# Patient Record
Sex: MALE | Race: WHITE | NOT HISPANIC OR LATINO | URBAN - METROPOLITAN AREA
[De-identification: names, ages, dates, MRNs, and addresses within clinical notes are randomized per-mention and may not be internally consistent; named-entity substitution may affect disease eponyms.]

---

## 2020-03-16 ENCOUNTER — EMERGENCY (EMERGENCY)
Facility: HOSPITAL | Age: 60
LOS: 0 days | Discharge: HOME | End: 2020-03-16
Attending: EMERGENCY MEDICINE | Admitting: EMERGENCY MEDICINE
Payer: MEDICAID

## 2020-03-16 VITALS
HEART RATE: 89 BPM | SYSTOLIC BLOOD PRESSURE: 159 MMHG | TEMPERATURE: 97 F | RESPIRATION RATE: 19 BRPM | DIASTOLIC BLOOD PRESSURE: 94 MMHG | OXYGEN SATURATION: 98 %

## 2020-03-16 VITALS
DIASTOLIC BLOOD PRESSURE: 90 MMHG | SYSTOLIC BLOOD PRESSURE: 155 MMHG | OXYGEN SATURATION: 99 % | HEART RATE: 86 BPM | RESPIRATION RATE: 18 BRPM | TEMPERATURE: 97 F
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
## 2020-03-16 DIAGNOSIS — S61.412A LACERATION WITHOUT FOREIGN BODY OF LEFT HAND, INITIAL ENCOUNTER: ICD-10-CM

## 2020-03-16 DIAGNOSIS — Z23 ENCOUNTER FOR IMMUNIZATION: ICD-10-CM
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
## 2020-03-16 PROCEDURE — 73130 X-RAY EXAM OF HAND: CPT | Mod: 26,LT

## 2020-03-16 PROCEDURE — 99283 EMERGENCY DEPT VISIT LOW MDM: CPT | Mod: 25

## 2020-03-16 PROCEDURE — 12001 RPR S/N/AX/GEN/TRNK 2.5CM/<: CPT

## 2020-03-16 RX ORDER — TETANUS TOXOID, REDUCED DIPHTHERIA TOXOID AND ACELLULAR PERTUSSIS VACCINE, ADSORBED 5; 2.5; 8; 8; 2.5 [IU]/.5ML; [IU]/.5ML; UG/.5ML; UG/.5ML; UG/.5ML
0.5 SUSPENSION INTRAMUSCULAR ONCE
Refills: 0 | Status: COMPLETED | OUTPATIENT
Start: 2020-03-16 | End: 2020-03-16

## 2020-03-16 RX ADMIN — TETANUS TOXOID, REDUCED DIPHTHERIA TOXOID AND ACELLULAR PERTUSSIS VACCINE, ADSORBED 0.5 MILLILITER(S): 5; 2.5; 8; 8; 2.5 SUSPENSION INTRAMUSCULAR at 17:47

## 2020-03-16 NOTE — ED PROVIDER NOTE - NSFOLLOWUPINSTRUCTIONS_ED_ALL_ED_FT
Follow up with Dr. Santiago on Wed.     Laceration    A laceration is a cut that goes through all of the layers of the skin and into the tissue that is right under the skin. Some lacerations heal on their own. Others need to be closed with skin adhesive strips, skin glue, stitches (sutures), or staples. Proper laceration care minimizes the risk of infection and helps the laceration to heal better.  If non-absorbable stitches or staples have been placed, they must be taken out within the time frame instructed by your healthcare provider.    SEEK IMMEDIATE MEDICAL CARE IF YOU HAVE ANY OF THE FOLLOWING SYMPTOMS: swelling around the wound, worsening pain, drainage from the wound, red streaking going away from your wound, inability to move finger or toe near the laceration, or discoloration of skin near the laceration.

## 2020-03-16 NOTE — ED PROVIDER NOTE - CARE PLAN
Principal Discharge DX:	Skin avulsion  Secondary Diagnosis:	Tetanus-diphtheria vaccination administered at current visit

## 2020-03-16 NOTE — ED PROVIDER NOTE - CLINICAL SUMMARY MEDICAL DECISION MAKING FREE TEXT BOX
60yo M no significant past medical history unknown last tetanus presenting with L hand injury with saw immediately PTA. No numbness/focal weakness, no other injuries or other complaints. imaging reviewed. Comfortable with discharge and follow-up outpatient, strict return precautions given. Endorses understanding of all of this and aware that they can return at any time for new or concerning symptoms. No further questions or concerns at this time

## 2020-03-16 NOTE — ED PROVIDER NOTE - NS ED ROS FT
Constitutional: (-) weakness  Musculoskeletal: (-) joint pain,  Integumentary: (+) bleeding, (+) lac  Neurological: (-) tingling, (-)numbness,  Allergic/Immunologic: (-) pruritus

## 2020-03-16 NOTE — ED PROVIDER NOTE - PHYSICAL EXAMINATION
Physical Exam    Vital Signs: I have reviewed the initial vital signs.  Constitutional: well-nourished, appears stated age, no acute distress  Eyes: Conjunctiva pink, Sclera clear,  Cardiovascular: S1 and S2, regular rate, regular rhythm, well-perfused extremities, radial pulses equal and 2+  Musculoskeletal: arm and well perfused x4, moving all extremities, no edema. 2+ equal pulses throughout. <2sec capillary refill throughout. + full ROM active/passive across PIP/DIP/MCP. No FB.   Integumentary: L hand multiple skin avulsions throughout fingers. L 4th finger laceration across ventral surface, superficial, +venous bleeding, no visible tendon  Neurologic: awake, alert, nvi

## 2020-03-16 NOTE — ED PROVIDER NOTE - ATTENDING CONTRIBUTION TO CARE
60yo M no significant past medical history unknown last tetanus presenting with L hand injury with saw immediately PTA. No numbness/focal weakness, no other injuries or other complaints.   Constitutional: Well appearing. No acute distress. Non toxic.   Eyes: PERRLA. Extraocular movements intact, no entrapment. Conjunctiva normal.   ENT: No nasal discharge. Moist mucus membranes.  Neck: Supple, non tender, full range of motion.  Ext: Warm and well perfused x4, moving all extremities, no edema. 2+ equal pulses throughout. <2sec capillary refill throughout. +L hand multiple skin avulsions throughout fingers. L 4th finger laceration across ventral surface, superficial, +venous bleeding, no visible tendon, full ROM active/passive across PIP/DIP/MCP. No FB.   Psy: Cooperative, appropriate.   Skin: Warm, dry, no rash  Neuro: CN2-12 grossly intact no sensory or motor deficits throughout, no drift, no ataxia

## 2020-03-16 NOTE — ED PROVIDER NOTE - OBJECTIVE STATEMENT
60 yo male, no pmh, unsure ot tetanus status, presents to ed for laceration ro left hand. occurred today, using saw,  mild, bleeding. Denies, weakness, joint pain.

## 2020-03-16 NOTE — ED PROVIDER NOTE - PATIENT PORTAL LINK FT
You can access the FollowMyHealth Patient Portal offered by [facility] by registering at the following website: [email or web address]. By joining [employer]’s FollowMyHealth portal, you will also be able to view your health information using other applications (apps) compatible with our system.

## 2020-03-16 NOTE — ED PROVIDER NOTE - CARE PROVIDER_API CALL
Moises Hebert (DO)  Plastic Surgery  [address] [place] [address]  Phone: [phone]  Fax: [phone]  Follow Up Time: 1-3 Days

## 2020-09-26 ENCOUNTER — EMERGENCY (EMERGENCY)
Facility: HOSPITAL | Age: 60
LOS: 0 days | Discharge: HOME | End: 2020-09-26
Attending: EMERGENCY MEDICINE | Admitting: EMERGENCY MEDICINE
Payer: MEDICAID

## 2020-09-26 VITALS
HEART RATE: 76 BPM | SYSTOLIC BLOOD PRESSURE: 136 MMHG | OXYGEN SATURATION: 98 % | RESPIRATION RATE: 17 BRPM | TEMPERATURE: 99 F | DIASTOLIC BLOOD PRESSURE: 84 MMHG

## 2020-09-26 DIAGNOSIS — Z87.39 PERSONAL HISTORY OF OTHER DISEASES OF THE MUSCULOSKELETAL SYSTEM AND CONNECTIVE TISSUE: ICD-10-CM

## 2020-09-26 DIAGNOSIS — D35.2 BENIGN NEOPLASM OF PITUITARY GLAND: ICD-10-CM

## 2020-09-26 PROBLEM — M19.90 UNSPECIFIED OSTEOARTHRITIS, UNSPECIFIED SITE: Chronic | Status: ACTIVE | Noted: 2020-03-16

## 2020-09-26 PROCEDURE — 99284 EMERGENCY DEPT VISIT MOD MDM: CPT

## 2020-09-26 PROCEDURE — 70450 CT HEAD/BRAIN W/O DYE: CPT | Mod: 26

## 2020-09-26 NOTE — ED PROVIDER NOTE - PATIENT PORTAL LINK FT
You can access the FollowMyHealth Patient Portal offered by [facility] by registering at the following website: [email or web address]. By joining [employer]’s FollowMyHealth portal, you will also be able to view your health information using other applications (apps) compatible with our system.

## 2020-09-26 NOTE — ED PROVIDER NOTE - PHYSICAL EXAMINATION
CONST: NAD  EYES: PERRL, EOMI, Sclera and conjunctiva clear. Pressure 10/10 b/l. Vision 20/25 b/l  ENT: No nasal discharge. Oropharynx normal appearing, no erythema or exudates. No abscess or swelling. Uvula midline.   NECK: Non-tender, no meningeal signs. normal ROM. supple   CARD: S1 S2; No jvd  RESP: Equal BS B/L, No wheezes, rhonchi or rales. No distress  GI: Soft, non-tender, non-distended. no cva tenderness. normal BS  MS: Normal ROM in all extremities. pulses 2 +. no calf tenderness or swelling  SKIN: Warm, dry, no acute rashes. Good turgor  NEURO: A&Ox3, No focal deficits. Strength 5/5 with no sensory deficits. Steady gait. Finger to nose intact. Negative pronator drift

## 2020-09-26 NOTE — ED PROVIDER NOTE - OBJECTIVE STATEMENT
60y M no ppmh sent from opthalmologist for eval. Pt was seen yesterday for routine eye exam and had bitemporal hemiopsia told to fu for medical eval. Now pt presents with no complaint. Denies fever, ha, change in vision, cp, cough, sob, weakness, numbness, n/v/d/c, dysuria, hematuria

## 2020-09-26 NOTE — ED PROVIDER NOTE - ATTENDING CONTRIBUTION TO CARE
59 yo M presented to ED for MRI. Pt was seen by an ophthalmologist 2 days ago and was told he might have increased ICP, a brain mass or aneurysm and told him to come to the ER for an MRI. Patient did not come right away but then got nervous so he came to the ED. He is not having any HA, blurred vision, nausea, vomiting, abdominal pain, fever or chills.     Const: Well nourished, well developed, appears stated age  Eyes: PERRL, no conjunctival injection  HENT:  Neck supple without meningismus   CV: RRR, Warm, well-perfused extremities  RESP: CTA B/L, no tachypnea   GI: soft, non-tender, non-distended  MSK: No gross deformities appreciated  Skin: Warm, dry. No rashes  Neuro: Alert, CNs II-XII grossly intact. Sensation and motor function of extremities grossly intact.  Psych: Appropriate mood and affect.    Unclear why optho was concerned for increased ICP. Will do screening CT head

## 2020-09-26 NOTE — ED PROVIDER NOTE - CLINICAL SUMMARY MEDICAL DECISION MAKING FREE TEXT BOX
59 yo M presented to ED from [facility]. Pt found to have pituitary adenoma. Patient is not having any HA. Normal gait. Discussed findings with pt. DC home

## 2020-09-26 NOTE — ED PROVIDER NOTE - PROGRESS NOTE DETAILS
Results discussed in depth with pt and family, they are requesting to leave and fu outpt, refusing further evaluation in ED, risks and return precautions explained

## 2020-09-26 NOTE — ED ADULT TRIAGE NOTE - CHIEF COMPLAINT QUOTE
as per son, pt was seen by eye doctor and was sent in for stat MRI of brain and orbit to r/o mass or aneurysm. No visual changes as per son, pt was seen by eye doctor and was sent in for stat MRI of brain and orbit to r/o mass or aneurysm. No visual changes or pain. Findings were found at routine eye exam. No complaints

## 2020-09-26 NOTE — ED ADULT NURSE NOTE - OBJECTIVE STATEMENT
Pt aox3, in no acute distress, c/o Pt aox3, in no acute distress, c/o being sent in by eye doctor for increased icp. Pt asymptomatic.

## 2020-09-26 NOTE — ED ADULT NURSE NOTE - CHIEF COMPLAINT QUOTE
as per son, pt was seen by eye doctor and was sent in for stat MRI of brain and orbit to r/o mass or aneurysm. No visual changes or pain. Findings were found at routine eye exam. No complaints

## 2020-11-05 ENCOUNTER — OUTPATIENT (OUTPATIENT)
Dept: OUTPATIENT SERVICES | Facility: HOSPITAL | Age: 60
LOS: 1 days | Discharge: HOME | End: 2020-11-05
Payer: MEDICAID

## 2020-11-05 DIAGNOSIS — H53.47 HETERONYMOUS BILATERAL FIELD DEFECTS: ICD-10-CM

## 2020-11-05 PROCEDURE — 70553 MRI BRAIN STEM W/O & W/DYE: CPT | Mod: 26

## 2022-05-21 ENCOUNTER — OUTPATIENT (OUTPATIENT)
Dept: OUTPATIENT SERVICES | Facility: HOSPITAL | Age: 62
LOS: 1 days | Discharge: HOME | End: 2022-05-21
Payer: MEDICAID

## 2022-05-21 DIAGNOSIS — R51.9 HEADACHE, UNSPECIFIED: ICD-10-CM

## 2022-05-21 PROCEDURE — 70553 MRI BRAIN STEM W/O & W/DYE: CPT | Mod: 26

## 2022-08-12 PROBLEM — Z00.00 ENCOUNTER FOR PREVENTIVE HEALTH EXAMINATION: Status: ACTIVE | Noted: 2022-08-12

## 2022-08-17 ENCOUNTER — LABORATORY RESULT (OUTPATIENT)
Age: 62
End: 2022-08-17

## 2022-08-17 ENCOUNTER — NON-APPOINTMENT (OUTPATIENT)
Age: 62
End: 2022-08-17

## 2022-08-17 ENCOUNTER — APPOINTMENT (OUTPATIENT)
Dept: NEUROSURGERY | Facility: CLINIC | Age: 62
End: 2022-08-17

## 2022-08-17 VITALS
TEMPERATURE: 97.6 F | HEART RATE: 69 BPM | BODY MASS INDEX: 24.8 KG/M2 | DIASTOLIC BLOOD PRESSURE: 73 MMHG | SYSTOLIC BLOOD PRESSURE: 113 MMHG | WEIGHT: 140 LBS | HEIGHT: 63 IN | OXYGEN SATURATION: 98 %

## 2022-08-17 PROCEDURE — 99204 OFFICE O/P NEW MOD 45 MIN: CPT

## 2022-08-17 NOTE — DATA REVIEWED
[de-identified] : \par \par ACC: [id number] EXAM: MR SELLA ONLY WAW IC\par \par PROCEDURE DATE: 05/21/2022\par \par \par \par INTERPRETATION: Clinical History / Reason for exam: For evaluation of a pituitary macroadenoma.\par \par MRI OF THE SELLA TURCICA WITHOUT AND WITH CONTRAST\par \par TECHNIQUE:\par \par Multiplanar multisequence imaging of the sella turcica was performed on the low-field magnet before and after the intravenous administration of 6 cc (1.5 cc discarded) of Gadavist. In addition, transaxial diffusion weighted images and transaxial FLAIR images were obtained.\par \par COMPARISON:\par \par MRI of the sella turcica without and with contrast dated November 5, 2020.\par \par FINDINGS:\par \par Transaxial diffusion and transaxial FLAIR images of the brain demonstrate the third, fourth, and lateral ventricles to be normal in size and position.\par \par There is a large mass arising in the sella turcica resulting in enlargement of the sella turcica, effacement of the optic chiasm, anterior extension into the sphenoid sinuses, posterior extension into the prepontine cistern, and lateral extension into the cavernous sinuses (almost complete encasement of the right cavernous internal carotid artery and partial encasement of the left cavernous internal carotid artery). The mass measures 2.7 x 2.3 x 3.3 cm. Following the intravenous administration of Gadavist there is heterogeneous enhancement. This finding is consistent with pituitary macroadenoma.\par \par IMPRESSION:\par \par In comparison with the prior MRI of the sella turcica dated November 5, 2020:\par \par Again demonstrated is a large mass arising in the sella turcica consistent with a pituitary macroadenoma, unchanged.\par \par --- End of Report ---

## 2022-08-17 NOTE — ASSESSMENT
[FreeTextEntry1] : Patient is a 62-year-old gentleman who suffered worsening left eye vision approximately 2 years ago and was diagnosed with a pituitary macroadenoma.  Due to problems with the pandemic, he was lost to follow-up initially but was able to see an endocrinologist.  Per his report, his labs are normal and his prolactin is normal.  He continues to have diminished vision in the left eye.  We discussed the natural history of benign macroadenomas.  I explained that I believe surgical intervention is important in this situation to decompress the optic nerves and mitigate the risk of future vision loss.  I have advised him to obtain a new endocrine panel and continue follow-up with his endocrinologist.  I have referred him to see Dr. Brandon Black for visual field testing's.  I have also referred him to Dr. Richardson Birmingham for an introduction prior to surgical intervention.  We will obtain updated imaging and plan for surgery in early October per the patient.\par \par Dr. D' Amico independently reviewed all available images with patient and his son, MRI SELLA w/wo contrast 5/21/22. \par \par PLAN: \par - MRI Pituitary w/wo contrast\par - CT maxillofacial\par - Referral to ENT Dr. Birmingham\par - Endocrine lab panel, continue f/u with his endocrinologist Dr. Jim\par - Referral to see neuro- opth Dr. Brandon Black for visual field testing\par - RTC after imaging to review\par \par Patient and his son verbalize understanding of today’s discussion and next steps in treatment plan. \par \par \par \par A total of 45 minutes was spent reviewing the labs, imaging and physical examination of the patient. We discussed the diagnosis, and the plan. The patient's questions were answered. The patient demonstrated an excellent understanding of the plan. \par \par

## 2022-08-17 NOTE — REVIEW OF SYSTEMS
[As Noted in HPI] : as noted in HPI [Eyesight Problems] : eyesight problems [Fever] : no fever [Chills] : no chills [Confused or Disoriented] : no confusion [Memory Lapses or Loss] : no memory loss [Seizures] : no convulsions [Dizziness] : no dizziness [Difficulty Walking] : no difficulty walking [Chest Pain] : no chest pain [Palpitations] : no palpitations [Shortness Of Breath] : no shortness of breath [Cough] : no cough

## 2022-08-17 NOTE — HISTORY OF PRESENT ILLNESS
[de-identified] : 63 y/o male with PMHx of rheumatoid arthritis, pituitary adenoma dx Sept 2020 found during workup for bitemporal hemiopsia. \par \par Pt is accompanied by his son who assists in HPI today. Pt went to optometrist for left vision changes Sept 2020 for which he was sent to ER for further workup. He was dx with pituitary adenoma. He endorses difficulty seeing neurosurgeon s/t insurance issues. He recently repeated MRI 5/21/22 with report unchanged. \par He endorses continued left eye left side blurriness. He denies tunnel vision. Notes intermittent left eye double vision. \par He denies dizziness, headaches, abnormal growth of hands/feet, abnormal growth of facial features, significant unexplained weight loss/gain, abnormal hair growth/loss, or galactorrhea.\par \par He saw endocrinologist Dr. Jim in [place] 2 months ago and states labs were WNL, he does not have report with him for today's visit. \par  \par PCP: Dr. Isela Powell \par Endo: Dr. Mey Jim

## 2022-08-17 NOTE — PHYSICAL EXAM
[General Appearance - Alert] : alert [General Appearance - In No Acute Distress] : in no acute distress [Oriented To Time, Place, And Person] : oriented to person, place, and time [Impaired Insight] : insight and judgment were intact [Sclera] : the sclera and conjunctiva were normal [PERRL With Normal Accommodation] : pupils were equal in size, round, reactive to light, with normal accommodation [Hearing Threshold Finger Rub Not [place]] : hearing was normal [Neck Appearance] : the appearance of the neck was normal [] : no respiratory distress [Respiration, Rhythm And Depth] : normal respiratory rhythm and effort [Skin Color & Pigmentation] : normal skin color and pigmentation [Motor Tone] : muscle tone was normal in all four extremities [Motor Strength] : muscle strength was normal in all four extremities [Sensation Tactile Decrease] : light touch was intact [Abnormal Walk] : normal gait [FreeTextEntry5] : CN II- XII grossly intact, left blurred vision reported on exam

## 2022-08-19 LAB
ANION GAP SERPL CALC-SCNC: 13 MMOL/L
BUN SERPL-MCNC: 24 MG/DL
CALCIUM SERPL-MCNC: 9.5 MG/DL
CHLORIDE SERPL-SCNC: 105 MMOL/L
CO2 SERPL-SCNC: 22 MMOL/L
CREAT SERPL-MCNC: 1.26 MG/DL
DHEA-S SERPL-MCNC: 45.8 UG/DL
EGFR: 64 ML/MIN/1.73M2
ESTRADIOL SERPL-MCNC: 12 PG/ML
FSH SERPL-MCNC: 4.6 IU/L
GH SERPL-MCNC: <0.03 NG/ML
GLUCOSE SERPL-MCNC: 107 MG/DL
IGF-1 INTERP: NORMAL
IGF-I BLD-MCNC: 98 NG/ML
LH SERPL-ACNC: 3.9 IU/L
POTASSIUM SERPL-SCNC: 4.9 MMOL/L
PROLACTIN SERPL-MCNC: 0.9 NG/ML
SHBG SERPL-SCNC: 44.9 NMOL/L
SODIUM SERPL-SCNC: 140 MMOL/L
T3RU NFR SERPL: 1.1 TBI
T4 FREE SERPL-MCNC: 0.9 NG/DL
T4 SERPL-MCNC: 6 UG/DL
TESTOST SERPL-MCNC: 394 NG/DL
TSH SERPL-ACNC: 1.42 UIU/ML

## 2022-08-22 LAB
MONOMERIC PROLACTIN (ICMA)*: 0.74 NG/ML
PERCENT MACROPROLACTIN: 10 %
PROLACTIN, SERUM (ICMA)*: 0.82 NG/ML

## 2022-08-30 LAB
CORTICOSTEROID BIND GLOBULIN: 1.7 MG/DL
CORTIS SERPL-MCNC: 7.3 UG/DL
CORTISOL, FREE: 0.5 UG/DL
PFCX: 6.9 %

## 2022-09-11 ENCOUNTER — RESULT REVIEW (OUTPATIENT)
Age: 62
End: 2022-09-11

## 2022-09-11 ENCOUNTER — OUTPATIENT (OUTPATIENT)
Dept: OUTPATIENT SERVICES | Facility: HOSPITAL | Age: 62
LOS: 1 days | Discharge: HOME | End: 2022-09-11

## 2022-09-11 DIAGNOSIS — D35.2 BENIGN NEOPLASM OF PITUITARY GLAND: ICD-10-CM

## 2022-09-11 DIAGNOSIS — H54.62 UNQUALIFIED VISUAL LOSS, LEFT EYE, NORMAL VISION RIGHT EYE: ICD-10-CM

## 2022-09-11 PROCEDURE — 70553 MRI BRAIN STEM W/O & W/DYE: CPT | Mod: 26

## 2022-09-23 ENCOUNTER — APPOINTMENT (OUTPATIENT)
Dept: NEUROSURGERY | Facility: CLINIC | Age: 62
End: 2022-09-23

## 2022-09-23 DIAGNOSIS — Z87.39 PERSONAL HISTORY OF OTHER DISEASES OF THE MUSCULOSKELETAL SYSTEM AND CONNECTIVE TISSUE: ICD-10-CM

## 2022-09-23 PROCEDURE — 99212 OFFICE O/P EST SF 10 MIN: CPT | Mod: 95

## 2022-10-02 ENCOUNTER — OUTPATIENT (OUTPATIENT)
Dept: OUTPATIENT SERVICES | Facility: HOSPITAL | Age: 62
LOS: 1 days | Discharge: HOME | End: 2022-10-02

## 2022-10-02 ENCOUNTER — RESULT REVIEW (OUTPATIENT)
Age: 62
End: 2022-10-02

## 2022-10-02 DIAGNOSIS — H54.62 UNQUALIFIED VISUAL LOSS, LEFT EYE, NORMAL VISION RIGHT EYE: ICD-10-CM

## 2022-10-02 DIAGNOSIS — D35.2 BENIGN NEOPLASM OF PITUITARY GLAND: ICD-10-CM

## 2022-10-02 PROCEDURE — 70486 CT MAXILLOFACIAL W/O DYE: CPT | Mod: 26

## 2022-10-11 ENCOUNTER — OUTPATIENT (OUTPATIENT)
Dept: OUTPATIENT SERVICES | Facility: HOSPITAL | Age: 62
LOS: 1 days | Discharge: HOME | End: 2022-10-11

## 2022-10-11 DIAGNOSIS — R07.9 CHEST PAIN, UNSPECIFIED: ICD-10-CM

## 2022-10-11 PROCEDURE — 71046 X-RAY EXAM CHEST 2 VIEWS: CPT | Mod: 26

## 2022-10-13 ENCOUNTER — APPOINTMENT (OUTPATIENT)
Dept: NEUROSURGERY | Facility: CLINIC | Age: 62
End: 2022-10-13

## 2022-10-13 PROCEDURE — 99215 OFFICE O/P EST HI 40 MIN: CPT | Mod: 95

## 2022-10-13 NOTE — HISTORY OF PRESENT ILLNESS
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 \par \stephanie Presented for evaluation 8/17/22 and endorsed he went to optometrist for left vision changes Sept 2020 for which he was sent to ER for further workup. He was dx with pituitary adenoma. He endorses difficulty seeing neurosurgeon s/t insurance issues. His most recent MRI done 5/21/22 with report unchanged. \par He endorses continued left eye left side blurriness. He denies tunnel vision. Notes intermittent left eye double vision. \par He denies dizziness, headaches, abnormal growth of hands/feet, abnormal growth of facial features, significant unexplained weight loss/gain, abnormal hair growth/loss, or galactorrhea.\par He saw endocrinologist Dr. Jim in [place] 2 months ago and states labs were WNL, he does not have report with him for today's visit. \par Patient referred to see neuro-opth Brandon Black and plan made for him to repeat MRI pituitary, CT max/face. \par \par Patient returned 9/23/22 and MRI Sella done 9/11/22 reviewed. Plan made for surgical intervention 10/21/22.\par Patient again referred to see neuro- opth Brandon Black and plan made for pre-op CT max/face and see ENT Dr. Birmingham.\par \par TODAY patient returns for follow- up and to discuss surgery further. He is accompanied by his son for today's visit. He is scheduled to see ENT Dr. Birmingham 10/20. \par  \par PCP: Dr. Isela Powell \par Endo: Dr. Mey Jim \par

## 2022-10-13 NOTE — DATA REVIEWED
[de-identified] : \par \par ACC: [id number] EXAM: CT MAXILLOFACIAL\par \par PROCEDURE DATE: 10/02/2022\par \par \par \par INTERPRETATION: CLINICAL INDICATION: Preoperative exam for macroadenoma.\par \par TECHNIQUE: CT of the sinuses was performed without intravenous contrast.\par \par Helically acquired images through the sinuses using multirow detector technique were reformatted in coronal and sagittal plane and viewed in bone and soft tissue window. Images were acquired without gantry tilt or head guerra, and included the tip of the nose, upper lips, and ears in the field of view.\par \par COMPARISON: CT head dated 9/26/2020.\par \par FINDINGS:\par SPHENOID SINUSES, DRAINAGE PATHWAYS AND ASSOCIATED AREAS:\par There is thinning versus dehiscence of the posterior sphenoid sinus walls bilaterally with extension of the tumor into the posterior aspect of the sphenoid sinuses bilaterally. There is erosive changes noted of the sphenoid septum which directly abuts the mass. There is bony thinning versus dehiscence of the cavernous sinuses as well as the dorsum sella.\par \par The bones adjacent to the sinuses, including the lamina papyracea, cribriform plate and fovea ethmoidalis, are intact.\par \par FRONTAL SINUSES, DRAINAGE PATHWAYS AND ASSOCIATED AREAS:\par The frontal sinuses are developed and well aerated. The frontal sinus outflow tracts are free of mucosal disease.\par \par NASAL SEPTUM:\par There is mild leftward nasal deviation.\par \par NASAL CAVITY:\par Evaluation of the nasal cavity fails to demonstrate dominant masses.\par \par ETHMOID SINUSES:\par The ethmoid air cells are free of mucosal disease.\par \par MAXILLARY SINUSES, DRAINAGE PATHWAYS AND ASSOCIATED AREAS:\par There is mild mucosal thickening of the bilateral maxillary sinuses. The ostiomeatal units are free of mucosal disease.\par \par IMPRESSION:\par Redemonstration of an expansile solid/suprasellar mass with bony dehiscence of the bilateral cavernous sinuses, posterior wall of the sphenoid sinuses as well as the dorsum sella.\par \par --- End of Report --- [de-identified] : \par \par ACC: [id number] EXAM: MR SELLA ONLY WAW IC\par \par PROCEDURE DATE: 09/11/2022\par \par \par \par INTERPRETATION: Clinical indication: Follow-up pituitary microadenoma. Left vision loss.\par \par Technique: Contrast enhanced sella MRI was performed.\par \par Through the brain axial FLAIR images as well as post contrast images were obtained. Through the sella, coronal T2, T1, postcontrast T1 fat suppression dynamic T1, sagittal T1, post contrast T1 fat suppression sequences were obtained.\par \par 7.5 mls of Gadavist was administered intravenously without complication and 0 mls were discarded.\par \par COMPARISON: MR sella dated 5/21/2022.\par \par FINDINGS:\par \par Since prior examination there has been slight interval growth in a large enhancing mass within the sella and suprasellar cisterns. There is significant upper mass effect upon the optic chiasm. The mass measures 3.7 cm in craniocaudal extent. The mass likely invades the bilateral cavernous sinuses and completely surrounds the right cavernous ICA and partially surrounds the left cavernous ICA, series 9 image 8.\par \par The ventricles and sulci are within normal limits.\par \par There are no areas of abnormal signal or enhancement within the brain parenchyma.\par \par There is no intraparenchymal hematoma, mass effect or midline shift.\par \par No abnormal extra-axial fluid collections are present.\par \par The calvarium is intact.\par \par The visualized intraorbital compartments, paranasal sinuses and tympanomastoid cavities appear free of acute disease.\par \par \par IMPRESSION:\par Since prior MR sella of 5/21/2022:\par \par There has been slight continued increase in size of a pituitary macroadenoma with invasion of the bilateral cavernous sinuses as well as significant mass effect upon the optic chiasm..\par \par --- End of Report ---

## 2022-10-13 NOTE — PHYSICAL EXAM
[General Appearance - Alert] : alert [General Appearance - In No Acute Distress] : in no acute distress [Sclera] : the sclera and conjunctiva were normal [Neck Appearance] : the appearance of the neck was normal [] : no respiratory distress [Respiration, Rhythm And Depth] : normal respiratory rhythm and effort [Skin Color & Pigmentation] : normal skin color and pigmentation

## 2022-10-13 NOTE — REVIEW OF SYSTEMS
[As Noted in HPI] : as noted in HPI [Fever] : no fever [Chills] : no chills [Confused or Disoriented] : no confusion [Facial Weakness] : no facial weakness [Arm Weakness] : no arm weakness [Hand Weakness] : no hand weakness [Leg Weakness] : no leg weakness [Numbness] : no numbness [Tingling] : no tingling [Seizures] : no convulsions [Dizziness] : no dizziness [Chest Pain] : no chest pain [Palpitations] : no palpitations [Shortness Of Breath] : no shortness of breath [Cough] : no cough

## 2022-10-13 NOTE — REASON FOR VISIT
[Follow-Up: _____] : a [unfilled] follow-up visit [Home] : at home, [unfilled] , at the time of the visit. [Medical Office: ([facility])___] : at the medical office located in  [Family Member] : family member [Patient] : the patient [FreeTextEntry1] : pituitary macroadenoma, discuss surgery more

## 2022-10-20 ENCOUNTER — APPOINTMENT (OUTPATIENT)
Dept: OTOLARYNGOLOGY | Facility: CLINIC | Age: 62
End: 2022-10-20

## 2022-10-20 ENCOUNTER — TRANSCRIPTION ENCOUNTER (OUTPATIENT)
Age: 62
End: 2022-10-20

## 2022-10-20 VITALS
TEMPERATURE: 98 F | RESPIRATION RATE: 16 BRPM | DIASTOLIC BLOOD PRESSURE: 86 MMHG | WEIGHT: 135.36 LBS | HEIGHT: 65 IN | OXYGEN SATURATION: 98 % | SYSTOLIC BLOOD PRESSURE: 147 MMHG | HEART RATE: 84 BPM

## 2022-10-20 VITALS
WEIGHT: 135 LBS | DIASTOLIC BLOOD PRESSURE: 85 MMHG | HEART RATE: 63 BPM | BODY MASS INDEX: 23.92 KG/M2 | SYSTOLIC BLOOD PRESSURE: 133 MMHG | HEIGHT: 63 IN | TEMPERATURE: 96.5 F

## 2022-10-20 LAB — SARS-COV-2 N GENE NPH QL NAA+PROBE: NOT DETECTED

## 2022-10-20 PROCEDURE — 31231 NASAL ENDOSCOPY DX: CPT

## 2022-10-20 PROCEDURE — 99204 OFFICE O/P NEW MOD 45 MIN: CPT | Mod: 25

## 2022-10-20 RX ORDER — INFLUENZA VIRUS VACCINE 15; 15; 15; 15 UG/.5ML; UG/.5ML; UG/.5ML; UG/.5ML
0.5 SUSPENSION INTRAMUSCULAR ONCE
Refills: 0 | Status: DISCONTINUED | OUTPATIENT
Start: 2022-10-21 | End: 2022-10-24

## 2022-10-20 NOTE — ASSESSMENT
[FreeTextEntry1] : 62M referred for endoscopic transsphenoidal pituitary tumor resection, planned for 10/21/22. He has large pituitary macroadenoma found in setting of left vision issues. He has seasonal allergies and takes allegra as needed. MRI demonstrates a 3.7cm enhancing mass within the sella and suprasellar cisterns w significant upper mass effect upon the optic chiasm and likely invasion of bilateral cavernous sinuses, as above. Nasal endoscopy is unremarkable.\par Plan for endoscopic transsphenoidal approach to resection of pituitary macroadenoma. I discussed my role in the procedure in detail including all risks and postoperative expectations. I reviewed possibility for CSF leak as well and methods of repair. All questions were answered and they wish to proceed as planned.\par

## 2022-10-20 NOTE — PATIENT PROFILE ADULT - FUNCTIONAL ASSESSMENT - BASIC MOBILITY 1.
MEDICARE WELLNESS VISIT NOTE    HISTORY OF PRESENT ILLNESS:   Nicky Castanon presents for her Welcome to Medicare Medicare Wellness Visit.   She has no current complaints or concerns.      Patient Care Team:  Nan Cruz MD as PCP - General (Internal Medicine)        Patient Active Problem List   Diagnosis   • Allergic rhinitis   • Allergic conjunctivitis   • Vocal cord dysfunction   • Microscopic hematuria   • Chronic cough   • Anemia - Macrocytic Hyperchromic   • Elevated blood pressure   • Primary osteoarthritis of first carpometacarpal joint of right hand   • MGUS (monoclonal gammopathy of unknown significance)   • Leukopenia, unspecified type         Past Medical History:   Diagnosis Date   • Allergic conjunctivitis    • Allergic rhinitis    • Anemia    • Bronchitis 2007   • Elevated BP    • Failed conscious sedation during procedure 02/22/2017   • Fracture     left arm   • Fracture 2014    Rt elbow   • IBS (irritable bowel syndrome)     quiescent   • Kidney stone on left side     eswl scheduled for 2.7.13   • Monoclonal gammopathy    • Pneumonia 2006   • Right ureteral stone 2007    calcium stone   • Vocal cord dysfunction          Past Surgical History:   Procedure Laterality Date   • ------------other------------- Right     wrist/thumb surgery.   • Appendectomy      in High school   • Colonoscopy  2/2007    normal   • Colonoscopy diagnostic  02/22/2017    Affi 10yr recall, screening   • Elbow surgery Right 2/9/15   • Lithotripsy - gen'l class  12/2007   • Past surgical history  2/ 2013    PS of stent for kidney stoneDr. Nancy   • Tonsillectomy and adenoidectomy      in college         Social History     Tobacco Use   • Smoking status: Never Smoker   • Smokeless tobacco: Never Used   Vaping Use   • Vaping Use: never used   Substance Use Topics   • Alcohol use: Yes     Alcohol/week: 10.0 standard drinks     Types: 10 Standard drinks or equivalent per week     Comment: moderate   • Drug use: No      Drug use:    Drug Use:    No              Family History   Problem Relation Age of Onset   • Cancer Mother         liver   • Other Mother         subdural hematoma   • Cancer Father         pancreatic/liver    • Heart Father         MI possibly from emboli from CA   • Respiratory Father         COPD   • Respiratory Maternal Grandfather         COPD   • Cancer Maternal Aunt         lung   • NEGATIVE FAMILY HX OF Other         no breast, ovarian or colon CA   • Patient is unaware of any medical problems Maternal Grandmother    • Patient is unaware of any medical problems Paternal Grandmother    • Patient is unaware of any medical problems Paternal Grandfather    • Other Sister         fibromyalgia   • Heart Brother 47        CAD, Stent, MI, obese       Current Outpatient Medications   Medication Sig Dispense Refill   • atorvastatin (Lipitor) 10 MG tablet Take 1 tablet by mouth daily. 90 tablet 1   • omeprazole (PrilOSEC) 20 MG capsule Take 1 capsule by mouth 2 times daily. 180 capsule 3   • fluticasone (FLONASE) 50 MCG/ACT nasal spray Spray 1 spray in each nostril daily. 16 g 5   • ibuprofen (MOTRIN) 200 MG tablet Take 200 mg by mouth every 6 hours as needed for Pain.     • cetirizine (ZYRTEC) 10 MG tablet Take 10 mg by mouth daily as needed.      • albuterol 108 (90 Base) MCG/ACT inhaler Inhale 2 puffs into the lungs every 4 hours as needed for shortness of breath or wheezing. 18 g 0     No current facility-administered medications for this visit.        The following items on the Medicare Health Risk Assessment were found to be positive         Vision and Hearing screens:    Hearing Screening    125Hz 250Hz 500Hz 1000Hz 2000Hz 3000Hz 4000Hz 6000Hz 8000Hz   Right ear:            Left ear:            Comments: Hearing Test passed bilat.    Vision Screening Comments: Dr. Aleman      Advance Directive:   The patient has the following documents:  Power of  for Health Care    Cognitive/Functional Status: no  evidence of cognitive dysfunction by direct observation    Opioid Review: Nicky is not taking opioid medications.    Recent PHQ 2/9 Score:    PHQ 2:  Date Adult PHQ 2 Score Adult PHQ 2 Interpretation   9/28/2021 0 No further screening needed       PHQ 9:       DEPRESSION ASSESSMENT/PLAN:  Depression screening is negative no further plan needed.     Body mass index is 21.97 kg/m².    BMI ASSESSMENT/PLAN:  Patient BMI is within normal range.     Needed Screening/Treatment:   Annual mammogram , Bone density and Immunizations reviewed and patient needs: Influenza and Pneumococcal 23  Needed follow up:  None    See orders.   See Patient Instructions section.   No follow-ups on file.     Welcome to Medicare visit:    Patient was given a flu shot and a Pneumovax 23.  She will be scheduled for mammogram and bone density.  Labs will be reviewed.  See dictated visit     4 = No assist / stand by assistance

## 2022-10-20 NOTE — DATA REVIEWED
[de-identified] : MRI Sella 9/11/22:\par FINDINGS:\par \par Since prior examination there has been slight interval growth in a large enhancing mass within the sella and suprasellar cisterns. There is significant upper mass effect upon the optic chiasm. The mass measures 3.7 cm in craniocaudal extent. The mass likely invades the bilateral cavernous sinuses and completely surrounds the right cavernous ICA and partially surrounds the left cavernous ICA, series 9 image 8.\par \par The ventricles and sulci are within normal limits.\par \par There are no areas of abnormal signal or enhancement within the brain parenchyma.\par \par There is no intraparenchymal hematoma, mass effect or midline shift.\par \par No abnormal extra-axial fluid collections are present.\par \par The calvarium is intact.\par \par The visualized intraorbital compartments, paranasal sinuses and tympanomastoid cavities appear free of acute disease.\par \par \par IMPRESSION:\par Since prior MR sella of 5/21/2022:\par \par There has been slight continued increase in size of a pituitary macroadenoma with invasion of the bilateral cavernous sinuses as well as significant mass effect upon the optic chiasm..\par

## 2022-10-20 NOTE — HISTORY OF PRESENT ILLNESS
[de-identified] : 62M here for initial evaluation.\par \par He was referred for endoscopic transsphenoidal pituitary tumor resection, planned for 10/21/22. \par Pituitary macroadenoma found in setting of left vision issues.\par \par He has seasonal allergies, takes allegra as needed. No h/o sinusitis.\par \par MRI Sella 9/11/22:\par 3.7cm enhancing mass within the sella and suprasellar cisterns w significant upper mass effect upon the optic chiasm and likely invasion of bilateral cavernous sinuses; it completely surrounds the right cavernous ICA and partially surrounds the left cavernous ICA\par \par ROS otherwise unremarkable.

## 2022-10-20 NOTE — PROCEDURE
[FreeTextEntry3] : Nasal Endoscopy:\par nasal airways clear\par mild turbinate hypertrophy\par no mucopus or polyps

## 2022-10-20 NOTE — CONSULT LETTER
[Dear  ___] : Dear  [unfilled], [Courtesy Letter:] : I had the pleasure of seeing your patient, [unfilled], in my office today. [Consult Closing:] : Thank you very much for allowing me to participate in the care of this patient.  If you have any questions, please do not hesitate to contact me. [Sincerely,] : Sincerely, [DrYasmin  ___] : Dr. COLLIER [FreeTextEntry3] : Richardson Birmingham MD\par Department of Otolaryngology - Head and Neck Surgery\par [facility] [DrYasmin ___] : Dr. COLLIER

## 2022-10-20 NOTE — PATIENT PROFILE ADULT - FALL HARM RISK - UNIVERSAL INTERVENTIONS
Bed in lowest position, wheels locked, appropriate side rails in place/Call bell, personal items and telephone in reach/Instruct patient to call for assistance before getting out of bed or chair/Non-slip footwear when patient is out of bed/[place] to call system/Physically safe environment - no spills, clutter or unnecessary equipment/Purposeful Proactive Rounding/Room/bathroom lighting operational, light cord in reach

## 2022-10-20 NOTE — PRE-OP CHECKLIST - PATIENT'S PERSONAL PROPERTY GIVEN TO
3 bags on unit, valuables with security/on unit/security/safe 2 bags on unit, valuables with security/on unit/security/safe

## 2022-10-21 ENCOUNTER — TRANSCRIPTION ENCOUNTER (OUTPATIENT)
Age: 62
End: 2022-10-21

## 2022-10-21 ENCOUNTER — INPATIENT (INPATIENT)
Facility: HOSPITAL | Age: 62
LOS: 2 days | Discharge: HOME | DRG: 614 | End: 2022-10-24
Attending: NEUROLOGICAL SURGERY | Admitting: NEUROLOGICAL SURGERY
Payer: COMMERCIAL

## 2022-10-21 ENCOUNTER — APPOINTMENT (OUTPATIENT)
Dept: OTOLARYNGOLOGY | Facility: HOSPITAL | Age: 62
End: 2022-10-21

## 2022-10-21 ENCOUNTER — RESULT REVIEW (OUTPATIENT)
Age: 62
End: 2022-10-21

## 2022-10-21 ENCOUNTER — APPOINTMENT (OUTPATIENT)
Dept: NEUROSURGERY | Facility: HOSPITAL | Age: 62
End: 2022-10-21

## 2022-10-21 DIAGNOSIS — Z98.890 OTHER SPECIFIED POSTPROCEDURAL STATES: Chronic | ICD-10-CM

## 2022-10-21 LAB
ANION GAP SERPL CALC-SCNC: 12 MMOL/L — SIGNIFICANT CHANGE UP (ref 5–17)
ANION GAP SERPL CALC-SCNC: 12 MMOL/L — SIGNIFICANT CHANGE UP (ref 5–17)
BLD GP AB SCN SERPL QL: NEGATIVE — SIGNIFICANT CHANGE UP
BUN SERPL-MCNC: 20 MG/DL — SIGNIFICANT CHANGE UP (ref 7–23)
BUN SERPL-MCNC: 21 MG/DL — SIGNIFICANT CHANGE UP (ref 7–23)
CALCIUM SERPL-MCNC: 8.1 MG/DL — LOW (ref 8.4–10.5)
CALCIUM SERPL-MCNC: 8.2 MG/DL — LOW (ref 8.4–10.5)
CHLORIDE SERPL-SCNC: 105 MMOL/L — SIGNIFICANT CHANGE UP (ref 96–108)
CHLORIDE SERPL-SCNC: 106 MMOL/L — SIGNIFICANT CHANGE UP (ref 96–108)
CO2 SERPL-SCNC: 20 MMOL/L — LOW (ref 22–31)
CO2 SERPL-SCNC: 22 MMOL/L — SIGNIFICANT CHANGE UP (ref 22–31)
CREAT SERPL-MCNC: 1.13 MG/DL — SIGNIFICANT CHANGE UP (ref 0.5–1.3)
CREAT SERPL-MCNC: 1.15 MG/DL — SIGNIFICANT CHANGE UP (ref 0.5–1.3)
EGFR: 71 ML/MIN/1.73M2 — SIGNIFICANT CHANGE UP
EGFR: 73 ML/MIN/1.73M2 — SIGNIFICANT CHANGE UP
GLUCOSE BLDC GLUCOMTR-MCNC: 120 MG/DL — HIGH (ref 70–99)
GLUCOSE BLDC GLUCOMTR-MCNC: 164 MG/DL — HIGH (ref 70–99)
GLUCOSE SERPL-MCNC: 127 MG/DL — HIGH (ref 70–99)
GLUCOSE SERPL-MCNC: 153 MG/DL — HIGH (ref 70–99)
HCT VFR BLD CALC: 34.2 % — LOW (ref 39–50)
HGB BLD-MCNC: 11.4 G/DL — LOW (ref 13–17)
MAGNESIUM SERPL-MCNC: 1.6 MG/DL — SIGNIFICANT CHANGE UP (ref 1.6–2.6)
MCHC RBC-ENTMCNC: 29.6 PG — SIGNIFICANT CHANGE UP (ref 27–34)
MCHC RBC-ENTMCNC: 33.3 GM/DL — SIGNIFICANT CHANGE UP (ref 32–36)
MCV RBC AUTO: 88.8 FL — SIGNIFICANT CHANGE UP (ref 80–100)
NRBC # BLD: 0 /100 WBCS — SIGNIFICANT CHANGE UP (ref 0–0)
PHOSPHATE SERPL-MCNC: 3 MG/DL — SIGNIFICANT CHANGE UP (ref 2.5–4.5)
PLATELET # BLD AUTO: 254 K/UL — SIGNIFICANT CHANGE UP (ref 150–400)
POTASSIUM SERPL-MCNC: 4.1 MMOL/L — SIGNIFICANT CHANGE UP (ref 3.5–5.3)
POTASSIUM SERPL-MCNC: 4.7 MMOL/L — SIGNIFICANT CHANGE UP (ref 3.5–5.3)
POTASSIUM SERPL-SCNC: 4.1 MMOL/L — SIGNIFICANT CHANGE UP (ref 3.5–5.3)
POTASSIUM SERPL-SCNC: 4.7 MMOL/L — SIGNIFICANT CHANGE UP (ref 3.5–5.3)
RBC # BLD: 3.85 M/UL — LOW (ref 4.2–5.8)
RBC # FLD: 13 % — SIGNIFICANT CHANGE UP (ref 10.3–14.5)
RH IG SCN BLD-IMP: POSITIVE — SIGNIFICANT CHANGE UP
SODIUM SERPL-SCNC: 138 MMOL/L — SIGNIFICANT CHANGE UP (ref 135–145)
SODIUM SERPL-SCNC: 139 MMOL/L — SIGNIFICANT CHANGE UP (ref 135–145)
WBC # BLD: 8.03 K/UL — SIGNIFICANT CHANGE UP (ref 3.8–10.5)
WBC # FLD AUTO: 8.03 K/UL — SIGNIFICANT CHANGE UP (ref 3.8–10.5)

## 2022-10-21 PROCEDURE — 88333 PATH CONSLTJ SURG CYTO XM 1: CPT | Mod: 26

## 2022-10-21 PROCEDURE — 88360 TUMOR IMMUNOHISTOCHEM/MANUAL: CPT | Mod: 26

## 2022-10-21 PROCEDURE — 99222 1ST HOSP IP/OBS MODERATE 55: CPT | Mod: GC

## 2022-10-21 PROCEDURE — 61781 SCAN PROC CRANIAL INTRA: CPT

## 2022-10-21 PROCEDURE — 88305 TISSUE EXAM BY PATHOLOGIST: CPT | Mod: 26

## 2022-10-21 PROCEDURE — 15769 GRFG AUTOL SOFT TISS DIR EXC: CPT

## 2022-10-21 PROCEDURE — 99291 CRITICAL CARE FIRST HOUR: CPT | Mod: 24

## 2022-10-21 PROCEDURE — 99291 CRITICAL CARE FIRST HOUR: CPT

## 2022-10-21 PROCEDURE — 61782 SCAN PROC CRANIAL EXTRA: CPT

## 2022-10-21 PROCEDURE — 88341 IMHCHEM/IMCYTCHM EA ADD ANTB: CPT | Mod: 26,59

## 2022-10-21 PROCEDURE — 62165 REMOVE PITUIT TUMOR W/SCOPE: CPT | Mod: 62

## 2022-10-21 PROCEDURE — 88342 IMHCHEM/IMCYTCHM 1ST ANTB: CPT | Mod: 26,59

## 2022-10-21 DEVICE — DVC ADHERUS AUTOSPRAY W/ DURAL SEALANT EXT TIP: Type: IMPLANTABLE DEVICE | Status: FUNCTIONAL

## 2022-10-21 DEVICE — SURGIFLO HEMOSTATIC MATRIX KIT: Type: IMPLANTABLE DEVICE | Status: FUNCTIONAL

## 2022-10-21 DEVICE — SURGIFOAM PAD 8CM X 12.5CM X 10MM (100): Type: IMPLANTABLE DEVICE | Status: FUNCTIONAL

## 2022-10-21 DEVICE — SURGICEL 4 X 8": Type: IMPLANTABLE DEVICE | Status: FUNCTIONAL

## 2022-10-21 RX ORDER — ACETAMINOPHEN 500 MG
650 TABLET ORAL EVERY 6 HOURS
Refills: 0 | Status: DISCONTINUED | OUTPATIENT
Start: 2022-10-21 | End: 2022-10-24

## 2022-10-21 RX ORDER — DEXAMETHASONE 0.5 MG/5ML
1 ELIXIR ORAL ONCE
Refills: 0 | Status: COMPLETED | OUTPATIENT
Start: 2022-10-22 | End: 2022-10-22

## 2022-10-21 RX ORDER — DEXTROSE 50 % IN WATER 50 %
25 SYRINGE (ML) INTRAVENOUS ONCE
Refills: 0 | Status: DISCONTINUED | OUTPATIENT
Start: 2022-10-21 | End: 2022-10-24

## 2022-10-21 RX ORDER — SENNA PLUS 8.6 MG/1
2 TABLET ORAL AT BEDTIME
Refills: 0 | Status: DISCONTINUED | OUTPATIENT
Start: 2022-10-21 | End: 2022-10-24

## 2022-10-21 RX ORDER — ATORVASTATIN CALCIUM 80 MG/1
20 TABLET, FILM COATED ORAL AT BEDTIME
Refills: 0 | Status: DISCONTINUED | OUTPATIENT
Start: 2022-10-21 | End: 2022-10-21

## 2022-10-21 RX ORDER — SODIUM CHLORIDE 9 MG/ML
1000 INJECTION, SOLUTION INTRAVENOUS
Refills: 0 | Status: DISCONTINUED | OUTPATIENT
Start: 2022-10-21 | End: 2022-10-24

## 2022-10-21 RX ORDER — DEXTROSE 50 % IN WATER 50 %
12.5 SYRINGE (ML) INTRAVENOUS ONCE
Refills: 0 | Status: DISCONTINUED | OUTPATIENT
Start: 2022-10-21 | End: 2022-10-24

## 2022-10-21 RX ORDER — FENTANYL CITRATE 50 UG/ML
25 INJECTION INTRAVENOUS ONCE
Refills: 0 | Status: DISCONTINUED | OUTPATIENT
Start: 2022-10-21 | End: 2022-10-21

## 2022-10-21 RX ORDER — PANTOPRAZOLE SODIUM 20 MG/1
40 TABLET, DELAYED RELEASE ORAL
Refills: 0 | Status: DISCONTINUED | OUTPATIENT
Start: 2022-10-21 | End: 2022-10-22

## 2022-10-21 RX ORDER — INSULIN LISPRO 100/ML
VIAL (ML) SUBCUTANEOUS
Refills: 0 | Status: DISCONTINUED | OUTPATIENT
Start: 2022-10-21 | End: 2022-10-22

## 2022-10-21 RX ORDER — OXYCODONE HYDROCHLORIDE 5 MG/1
5 TABLET ORAL EVERY 4 HOURS
Refills: 0 | Status: DISCONTINUED | OUTPATIENT
Start: 2022-10-21 | End: 2022-10-24

## 2022-10-21 RX ORDER — ATORVASTATIN CALCIUM 80 MG/1
0 TABLET, FILM COATED ORAL
Qty: 0 | Refills: 0 | DISCHARGE

## 2022-10-21 RX ORDER — CEFTRIAXONE 500 MG/1
2000 INJECTION, POWDER, FOR SOLUTION INTRAMUSCULAR; INTRAVENOUS ONCE
Refills: 0 | Status: COMPLETED | OUTPATIENT
Start: 2022-10-21 | End: 2022-10-21

## 2022-10-21 RX ORDER — CEFAZOLIN SODIUM 1 G
2000 VIAL (EA) INJECTION EVERY 8 HOURS
Refills: 0 | Status: DISCONTINUED | OUTPATIENT
Start: 2022-10-21 | End: 2022-10-24

## 2022-10-21 RX ORDER — CEFAZOLIN SODIUM 1 G
2000 VIAL (EA) INJECTION EVERY 8 HOURS
Refills: 0 | Status: DISCONTINUED | OUTPATIENT
Start: 2022-10-21 | End: 2022-10-21

## 2022-10-21 RX ORDER — SODIUM CHLORIDE 0.65 %
1 AEROSOL, SPRAY (ML) NASAL THREE TIMES A DAY
Refills: 0 | Status: DISCONTINUED | OUTPATIENT
Start: 2022-10-21 | End: 2022-10-24

## 2022-10-21 RX ORDER — OXYCODONE HYDROCHLORIDE 5 MG/1
10 TABLET ORAL EVERY 4 HOURS
Refills: 0 | Status: DISCONTINUED | OUTPATIENT
Start: 2022-10-21 | End: 2022-10-24

## 2022-10-21 RX ORDER — MAGNESIUM SULFATE 500 MG/ML
2 VIAL (ML) INJECTION ONCE
Refills: 0 | Status: COMPLETED | OUTPATIENT
Start: 2022-10-21 | End: 2022-10-21

## 2022-10-21 RX ORDER — DEXTROSE 50 % IN WATER 50 %
15 SYRINGE (ML) INTRAVENOUS ONCE
Refills: 0 | Status: DISCONTINUED | OUTPATIENT
Start: 2022-10-21 | End: 2022-10-24

## 2022-10-21 RX ORDER — ACETAMINOPHEN 500 MG
2 TABLET ORAL
Qty: 0 | Refills: 0 | DISCHARGE

## 2022-10-21 RX ORDER — ONDANSETRON 8 MG/1
4 TABLET, FILM COATED ORAL EVERY 6 HOURS
Refills: 0 | Status: DISCONTINUED | OUTPATIENT
Start: 2022-10-21 | End: 2022-10-24

## 2022-10-21 RX ORDER — CHLORHEXIDINE GLUCONATE 213 G/1000ML
1 SOLUTION TOPICAL EVERY 12 HOURS
Refills: 0 | Status: DISCONTINUED | OUTPATIENT
Start: 2022-10-21 | End: 2022-10-21

## 2022-10-21 RX ORDER — SODIUM CHLORIDE 0.65 %
1 AEROSOL, SPRAY (ML) NASAL
Refills: 0 | Status: DISCONTINUED | OUTPATIENT
Start: 2022-10-21 | End: 2022-10-21

## 2022-10-21 RX ORDER — SODIUM CHLORIDE 9 MG/ML
1000 INJECTION INTRAMUSCULAR; INTRAVENOUS; SUBCUTANEOUS
Refills: 0 | Status: DISCONTINUED | OUTPATIENT
Start: 2022-10-21 | End: 2022-10-22

## 2022-10-21 RX ORDER — HYDRALAZINE HCL 50 MG
10 TABLET ORAL ONCE
Refills: 0 | Status: COMPLETED | OUTPATIENT
Start: 2022-10-21 | End: 2022-10-21

## 2022-10-21 RX ORDER — GLUCAGON INJECTION, SOLUTION 0.5 MG/.1ML
1 INJECTION, SOLUTION SUBCUTANEOUS ONCE
Refills: 0 | Status: DISCONTINUED | OUTPATIENT
Start: 2022-10-21 | End: 2022-10-24

## 2022-10-21 RX ADMIN — OXYCODONE HYDROCHLORIDE 10 MILLIGRAM(S): 5 TABLET ORAL at 20:54

## 2022-10-21 RX ADMIN — OXYCODONE HYDROCHLORIDE 10 MILLIGRAM(S): 5 TABLET ORAL at 16:36

## 2022-10-21 RX ADMIN — CHLORHEXIDINE GLUCONATE 1 APPLICATION(S): 213 SOLUTION TOPICAL at 06:42

## 2022-10-21 RX ADMIN — SENNA PLUS 2 TABLET(S): 8.6 TABLET ORAL at 21:19

## 2022-10-21 RX ADMIN — Medication 100 MILLIGRAM(S): at 13:17

## 2022-10-21 RX ADMIN — OXYCODONE HYDROCHLORIDE 10 MILLIGRAM(S): 5 TABLET ORAL at 17:27

## 2022-10-21 RX ADMIN — OXYCODONE HYDROCHLORIDE 10 MILLIGRAM(S): 5 TABLET ORAL at 20:44

## 2022-10-21 RX ADMIN — FENTANYL CITRATE 25 MICROGRAM(S): 50 INJECTION INTRAVENOUS at 15:11

## 2022-10-21 RX ADMIN — Medication 100 MILLIGRAM(S): at 20:44

## 2022-10-21 RX ADMIN — Medication 10 MILLIGRAM(S): at 13:43

## 2022-10-21 RX ADMIN — Medication 1 DROP(S): at 21:52

## 2022-10-21 RX ADMIN — Medication 1 SPRAY(S): at 21:55

## 2022-10-21 RX ADMIN — FENTANYL CITRATE 25 MICROGRAM(S): 50 INJECTION INTRAVENOUS at 14:47

## 2022-10-21 RX ADMIN — Medication 25 GRAM(S): at 13:17

## 2022-10-21 RX ADMIN — Medication 5 MILLIGRAM(S): at 18:14

## 2022-10-21 RX ADMIN — OXYCODONE HYDROCHLORIDE 5 MILLIGRAM(S): 5 TABLET ORAL at 19:32

## 2022-10-21 RX ADMIN — Medication 2: at 21:38

## 2022-10-21 NOTE — H&P ADULT - NSHPPHYSICALEXAM_GEN_ALL_CORE
AOx3, FC, PERRL, EOMI, no facial, VFF on confrontation exam, does not some blurry vision in left eye  5/5 throughout, no drift  SILT  no clonus

## 2022-10-21 NOTE — CONSULT NOTE ADULT - SUBJECTIVE AND OBJECTIVE BOX
HISTORY OF PRESENT ILLNESS  YAZ WILD is a 62y Male with a past medical history of pituitary macroadenoma, hypertension and arthritis who was admitted for further management of his pituitary adenoma s/p transphenoidal resection (10/21/22).     Of note, patient was initially evaluated by an optometrist for left vision changes on 9/2020, who sent him to the emergency department where he was found to have a pituitary adenoma. He mentioned having difficulty seeing a neurosurgeon due to insurance issues. He continues to experience left sided blurriness. Patient was evaluated 2 months ago by an endocrinologist, Dr. Jim, who did a hormonal work-up which was reported to be within normal limits (per chart review, lab reports not available). He was evaluated by neurosurgery (8/17/22) and was then referred to see a neuro-ophthalmologists. MRI pituitary protocol (9/11/22) showed a pituitary mass measuring 3.7 cm in craniocaudal extent, with significant upper mass effect upon the optic chiasm, invading the bilateral cavernous sinuses, completely surrounding the right cavernous ICA and partially surrounding the left cavernous ICA.     Upon evaluation, patient reports that he has experienced headaches, blurry left sided vision, intermittent double vision, fatigue and dizziness when standing up for the past ~2 years. He also mentioned having mildly decreased libido, and reports having erectile dysfunction for the same time period. He says that he does get nocturnal erections but are the way they used to be. He has two sons (35 and 40 years old). He denied having any weight changes. Denied any changes in the frequency he has to shave his bear and continues to have adequate body hair distribution.     Labs obtained prior to admission (8/19/22):  - TSH: 1.42.  - Free T4: 0.9.  - FSH: 4.6.  - LH: 3.9  - Total Testosterone: 394.  - DHEAS: 45.8.  - Sex hormone binding globulin: 44.9.  - Estradiol: 12.  - Prolactin 0.9.  - Growth hormone: <0.03.   - IGF-1: 98.  - Cortisol serum: 7.3. Free Cortisol 0.5. (8/30/22).     PAST MEDICAL & SURGICAL HISTORY:  As per history of present illness.     SOCIAL HISTORY  - Work:   - Alcohol: Denied  - Smoking: Smoked for 15 years, 1/2 ppd, stopped 30 years ago.   - Recreational Drugs: Denied    ALLERGIES  No Known Allergies    CURRENT MEDICATIONS  acetaminophen     Tablet .. 650 milliGRAM(s) Oral every 6 hours PRN  artificial tears (preservative free) Ophthalmic Solution 1 Drop(s) Both EYES every 4 hours PRN  bisacodyl 5 milliGRAM(s) Oral daily PRN  ceFAZolin   IVPB 2000 milliGRAM(s) IV Intermittent every 8 hours  dextrose 5%. 1000 milliLiter(s) IV Continuous <Continuous>  dextrose 5%. 1000 milliLiter(s) IV Continuous <Continuous>  dextrose 50% Injectable 25 Gram(s) IV Push once  dextrose 50% Injectable 12.5 Gram(s) IV Push once  dextrose 50% Injectable 25 Gram(s) IV Push once  dextrose Oral Gel 15 Gram(s) Oral once PRN  enalapril 5 milliGRAM(s) Oral every 12 hours  glucagon  Injectable 1 milliGRAM(s) IntraMuscular once  influenza   Vaccine 0.5 milliLiter(s) IntraMuscular once  insulin lispro (ADMELOG) corrective regimen sliding scale   SubCutaneous Before meals and at bedtime  ondansetron Injectable 4 milliGRAM(s) IV Push every 6 hours PRN  oxyCODONE    IR 5 milliGRAM(s) Oral every 4 hours PRN  oxyCODONE    IR 10 milliGRAM(s) Oral every 4 hours PRN  pantoprazole    Tablet 40 milliGRAM(s) Oral before breakfast  senna 2 Tablet(s) Oral at bedtime  sodium chloride 0.65% Nasal 1 Spray(s) Both Nostrils three times a day  sodium chloride 0.9%. 1000 milliLiter(s) IV Continuous <Continuous>    REVIEW OF SYSTEMS  Constitutional:  Negative fever, chills or loss of appetite.  Eyes:  Negative blurry vision or double vision.  Cardiovascular:  Negative for chest pain or palpitations.  Respiratory:  Negative for cough, wheezing, or SOB.   Gastrointestinal:  Negative for nausea, vomiting, diarrhea, constipation, or abdominal pain.  Genitourinary:  Negative frequency, urgency or dysuria.  Neurologic:  No headache, confusion, dizziness, lightheadedness.    PHYSICAL EXAM  Vital Signs Last 24 Hrs  T(C): 36.4 (21 Oct 2022 16:28), Max: 36.4 (21 Oct 2022 14:19)  T(F): 97.6 (21 Oct 2022 16:28), Max: 97.6 (21 Oct 2022 14:19)  HR: 69 (21 Oct 2022 20:00) (61 - 94)  BP: 142/70 (21 Oct 2022 20:00) (118/71 - 160/84)  BP(mean): 100 (21 Oct 2022 20:00) (86 - 115)  RR: 15 (21 Oct 2022 20:00) (11 - 20)  SpO2: 100% (21 Oct 2022 20:00) (97% - 100%)    Parameters below as of 21 Oct 2022 20:00  Patient On (Oxygen Delivery Method): face tent  O2 Flow (L/min): 10  O2 Concentration (%): 35  Height (cm): 165.1 (10-21 @ 07:38)  Weight (kg): 61.4 (10-21 @ 07:38)  BMI (kg/m2): 22.5 (10-21 @ 07:38)    Constitutional: Awake, alert, in no acute distress.   HEENT: Normocephalic, atraumatic, LEI, no proptosis or lid retraction.   Neck: supple, no acanthosis, no thyromegaly or palpable thyroid nodules.  Respiratory: Lungs clear to ausculation bilaterally.   Cardiovascular: regular rhythm, normal S1 and S2, no audible murmurs.   GI: soft, non-tender, non-distended, bowel sounds present, no masses appreciated.  Extremities: No lower extremity edema, peripheral pulses present.   Skin: no rashes.   Psychiatric: AAO x 3. Normal affect/mood.     LABS                        11.4   8.03  )-----------( 254      ( 21 Oct 2022 12:30 )             34.2     10-21    139  |  105  |  20  ----------------------------<  153<H>  4.1   |  22  |  1.13    Ca    8.1<L>      21 Oct 2022 16:00  Phos  3.0     10-21  Mg     1.6     10-21    CAPILLARY BLOOD GLUCOSE  POCT Blood Glucose.: 120 mg/dL (21 Oct 2022 16:29)    ASSESSMENT / RECOMMENDATIONS  YAZ WILD is a 62y Male with a past medical history of pituitary macroadenoma, hypertension and arthritis who was admitted for further management of his pituitary adenoma s/p transphenoidal resection (10/21/22). Endocrinology was consulted for recommendations regarding post-operative management of pituitary adenoma.     Weight: 61 kg  BMI: 22  Creatinine: 1.13  GFR: 73    # Pituitary macroadenoma s/p resection  - Strict intake and output.   - Monitor for diabetes insipidus.   - Per primary team, patient received 4 mg of dexamethasone intra-op. He's also planned to receive 1 mg of dexamethasone tomorrow and will have cortisol checked on Sunday morning.   - Please also obtain a morning cortisol and a Free T4 level on Monday morning (10/24/22).   - Upon discharge, patient can continue to follow-up with his Endocrinologist.     Case discussed with Dr. Villa. Primary team updated.       Torin Webster    Endocrinology Fellow    Service Pager: [phone]  HISTORY OF PRESENT ILLNESS  YAZ WILD is a 62y Male with a past medical history of non-functioning pituitary macroadenoma, hypertension and arthritis who was admitted for further management of his pituitary adenoma s/p transphenoidal resection (10/21/22).     Of note, patient was initially evaluated by an optometrist for left vision changes on 9/2020, who sent him to the emergency department where he was found to have a pituitary adenoma. He mentioned having difficulty seeing a neurosurgeon due to insurance issues. He continues to experience left sided blurriness. Patient was evaluated 2 months ago by an endocrinologist, Dr. Jim, who did a hormonal work-up which was reported to be within normal limits (per chart review, lab reports not available). He was evaluated by neurosurgery (8/17/22) and was then referred to see a neuro-ophthalmologists. MRI pituitary protocol (9/11/22) showed a pituitary mass measuring 3.7 cm in craniocaudal extent, with significant upper mass effect upon the optic chiasm, invading the bilateral cavernous sinuses, completely surrounding the right cavernous ICA and partially surrounding the left cavernous ICA.     Upon evaluation, patient reports that he has experienced headaches, blurry left sided vision, intermittent double vision, fatigue and dizziness when standing up for the past ~2 years. He also mentioned having mildly decreased libido, and reports having erectile dysfunction for the same time period. He says that he does get nocturnal erections but are the way they used to be. He has two sons (35 and 40 years old). He denied having any weight changes. Denied any changes in the frequency he has to shave his bear and continues to have adequate body hair distribution.     Labs obtained prior to admission (8/19/22):  - TSH: 1.42.  - Free T4: 0.9.  - FSH: 4.6.  - LH: 3.9  - Total Testosterone: 394.  - DHEAS: 45.8.  - Sex hormone binding globulin: 44.9.  - Estradiol: 12.  - Prolactin 0.9.  - Growth hormone: <0.03.   - IGF-1: 98.  - Cortisol serum: 7.3. Free Cortisol 0.5. (8/30/22).     PAST MEDICAL & SURGICAL HISTORY:  As per history of present illness.     SOCIAL HISTORY  - Work:   - Alcohol: Denied  - Smoking: Smoked for 15 years, 1/2 ppd, stopped 30 years ago.   - Recreational Drugs: Denied    ALLERGIES  No Known Allergies    CURRENT MEDICATIONS  acetaminophen     Tablet .. 650 milliGRAM(s) Oral every 6 hours PRN  artificial tears (preservative free) Ophthalmic Solution 1 Drop(s) Both EYES every 4 hours PRN  bisacodyl 5 milliGRAM(s) Oral daily PRN  ceFAZolin   IVPB 2000 milliGRAM(s) IV Intermittent every 8 hours  dextrose 5%. 1000 milliLiter(s) IV Continuous <Continuous>  dextrose 5%. 1000 milliLiter(s) IV Continuous <Continuous>  dextrose 50% Injectable 25 Gram(s) IV Push once  dextrose 50% Injectable 12.5 Gram(s) IV Push once  dextrose 50% Injectable 25 Gram(s) IV Push once  dextrose Oral Gel 15 Gram(s) Oral once PRN  enalapril 5 milliGRAM(s) Oral every 12 hours  glucagon  Injectable 1 milliGRAM(s) IntraMuscular once  influenza   Vaccine 0.5 milliLiter(s) IntraMuscular once  insulin lispro (ADMELOG) corrective regimen sliding scale   SubCutaneous Before meals and at bedtime  ondansetron Injectable 4 milliGRAM(s) IV Push every 6 hours PRN  oxyCODONE    IR 5 milliGRAM(s) Oral every 4 hours PRN  oxyCODONE    IR 10 milliGRAM(s) Oral every 4 hours PRN  pantoprazole    Tablet 40 milliGRAM(s) Oral before breakfast  senna 2 Tablet(s) Oral at bedtime  sodium chloride 0.65% Nasal 1 Spray(s) Both Nostrils three times a day  sodium chloride 0.9%. 1000 milliLiter(s) IV Continuous <Continuous>    REVIEW OF SYSTEMS  Constitutional:  Negative fever, chills or loss of appetite.  Eyes:  Negative blurry vision or double vision.  Cardiovascular:  Negative for chest pain or palpitations.  Respiratory:  Negative for cough, wheezing, or SOB.   Gastrointestinal:  Negative for nausea, vomiting, diarrhea, constipation, or abdominal pain.  Genitourinary:  Negative frequency, urgency or dysuria.  Neurologic:  No headache, confusion, dizziness, lightheadedness.    PHYSICAL EXAM  Vital Signs Last 24 Hrs  T(C): 36.4 (21 Oct 2022 16:28), Max: 36.4 (21 Oct 2022 14:19)  T(F): 97.6 (21 Oct 2022 16:28), Max: 97.6 (21 Oct 2022 14:19)  HR: 69 (21 Oct 2022 20:00) (61 - 94)  BP: 142/70 (21 Oct 2022 20:00) (118/71 - 160/84)  BP(mean): 100 (21 Oct 2022 20:00) (86 - 115)  RR: 15 (21 Oct 2022 20:00) (11 - 20)  SpO2: 100% (21 Oct 2022 20:00) (97% - 100%)    Parameters below as of 21 Oct 2022 20:00  Patient On (Oxygen Delivery Method): face tent  O2 Flow (L/min): 10  O2 Concentration (%): 35  Height (cm): 165.1 (10-21 @ 07:38)  Weight (kg): 61.4 (10-21 @ 07:38)  BMI (kg/m2): 22.5 (10-21 @ 07:38)    Constitutional: Awake, alert, in no acute distress.   HEENT: Normocephalic, atraumatic, LEI, no proptosis or lid retraction.   Neck: supple, no acanthosis, no thyromegaly or palpable thyroid nodules.  Respiratory: Lungs clear to ausculation bilaterally.   Cardiovascular: regular rhythm, normal S1 and S2, no audible murmurs.   GI: soft, non-tender, non-distended, bowel sounds present, no masses appreciated.  Extremities: No lower extremity edema, peripheral pulses present.   Skin: no rashes.   Psychiatric: AAO x 3. Normal affect/mood.     LABS                        11.4   8.03  )-----------( 254      ( 21 Oct 2022 12:30 )             34.2     10-21    139  |  105  |  20  ----------------------------<  153<H>  4.1   |  22  |  1.13    Ca    8.1<L>      21 Oct 2022 16:00  Phos  3.0     10-21  Mg     1.6     10-21    CAPILLARY BLOOD GLUCOSE  POCT Blood Glucose.: 120 mg/dL (21 Oct 2022 16:29)    ASSESSMENT / RECOMMENDATIONS  YAZ WILD is a 62y Male with a past medical history of pituitary macroadenoma, hypertension and arthritis who was admitted for further management of his pituitary adenoma s/p transphenoidal resection (10/21/22). Endocrinology was consulted for recommendations regarding post-operative management of pituitary adenoma.     Weight: 61 kg  BMI: 22  Creatinine: 1.13  GFR: 73    # Pituitary macroadenoma s/p resection  - Strict intake and output.   - Monitor for diabetes insipidus.   - Per primary team, patient received 4 mg of dexamethasone intra-op. He's also planned to receive 1 mg of dexamethasone tomorrow and will have cortisol checked on James morning.   - Please also obtain a morning cortisol and a Free T4 level on Monday morning (10/24/22).   - Upon discharge, patient can continue to follow-up with his Endocrinologist.     Case discussed with Dr. Villa. Primary team updated.       Torin Webster    Endocrinology Fellow    Service Pager: [phone]

## 2022-10-21 NOTE — H&P ADULT - HISTORY OF PRESENT ILLNESS
Presented for evaluation 8/17/22 and endorsed he went to optometrist for left vision changes Sept 2020 for which he was sent to ER for further workup. He was dx with pituitary adenoma. He endorses difficulty seeing neurosurgeon s/t insurance issues. His most recent MRI done 5/21/22 with report unchanged.      He endorses continued left eye left side blurriness. He denies tunnel vision. Notes intermittent left eye double vision.      He denies dizziness, headaches, abnormal growth of hands/feet, abnormal growth of facial features, significant unexplained weight loss/gain, abnormal hair growth/loss, or galactorrhea.     He saw endocrinologist Dr. Jim in [place] 2 months ago and states labs were WNL, he does not have report with him for today's visit.      Patient referred to see neuro-opth Brandon Black and plan made for him to repeat MRI pituitary, CT max/face.      Patient returned 9/23/22 and MRI Sella done 9/11/22 reviewed. Plan made for surgical intervention 10/21/22.     Patient again referred to see neuro- opth Brandon Black and plan made for pre-op CT max/face and see ENT Dr. Birmingham.     TODAY patient present for planned endoscopic endonasal transphenoidal resection of pituitary tumor.     PCP: Dr. Isela Powell      Endo: Dr. Mey Jim

## 2022-10-21 NOTE — PROGRESS NOTE ADULT - SUBJECTIVE AND OBJECTIVE BOX
***********************************************  ADULT NSICU PROGRESS NOTE  YAZ WILD [id number] [facility] [id number]  ***********************************************    24H INTERVAL EVENTS: s/p transphenoidal approach for resection of pituitary gland neoplasm (Drs. D'Amico, Valencia).    ROS: negative except per mentioned above in 24h interval events.      HOSPITAL COURSE CARRIED FORWARD:    VITALS:    ICU Vital Signs Last 24 Hrs  T(C): 36.4 (21 Oct 2022 14:19), Max: 36.4 (21 Oct 2022 14:19)  T(F): 97.6 (21 Oct 2022 14:19), Max: 97.6 (21 Oct 2022 14:19)  HR: 61 (21 Oct 2022 15:00) (61 - 94)  BP: 120/66 (21 Oct 2022 15:00) (120/66 - 160/84)  BP(mean): 86 (21 Oct 2022 15:00) (86 - 115)  ABP: 131/56 (21 Oct 2022 15:00) (131/56 - 161/76)  ABP(mean): 80 (21 Oct 2022 15:00) (80 - 110)  RR: 13 (21 Oct 2022 15:00) (11 - 20)  SpO2: 99% (21 Oct 2022 15:00) (97% - 100%)    O2 Parameters below as of 21 Oct 2022 15:00  Patient On (Oxygen Delivery Method): face tent  O2 Flow (L/min): 10  O2 Concentration (%): 35            I&O's Summary    21 Oct 2022 07:01  -  21 Oct 2022 16:00  --------------------------------------------------------  IN: 325 mL / OUT: 240 mL / NET: 85 mL        EXAM:     [place] Coma Scale: 1/2/1 (patient somnolent/post anesthesia, opt to allow patient to remain sleeping)    General: laying in bed fast asleep, nasal splints  Neuro     MS: Asleep    CN: PERRL, gaze conjugate, face symmetric    Mot: bulk normal, power assessment deferred  Chest: nonlabored respirations, no adventitious lung sounds bilaterally, heart regular rate/rhythm, present S1/S2, no murmurs or rubs  Abdomen: nondistended, soft and nontender without peritoneal signs, normoactive bowel sounds  Extremities: no clubbing, well-perfused, no edema    LABORATORY DATA:                            11.4   8.03  )-----------( 254      ( 21 Oct 2022 12:30 )             34.2     10-21    138  |  106  |  21  ----------------------------<  127<H>  4.7   |  20<L>  |  1.15    Ca    8.2<L>      21 Oct 2022 12:30  Phos  3.0     10-21  Mg     1.6     10-21          MEDICATIONS  (STANDING):  ceFAZolin   IVPB 2000 milliGRAM(s) IV Intermittent every 8 hours  influenza   Vaccine 0.5 milliLiter(s) IntraMuscular once  pantoprazole    Tablet 40 milliGRAM(s) Oral before breakfast  senna 2 Tablet(s) Oral at bedtime  sodium chloride 0.65% Nasal 1 Spray(s) Both Nostrils two times a day  sodium chloride 0.9%. 1000 milliLiter(s) (75 mL/Hr) IV Continuous <Continuous>    MEDICATIONS  (PRN):  acetaminophen     Tablet .. 650 milliGRAM(s) Oral every 6 hours PRN Temp greater or equal to 38C (100.4F), Mild Pain (1 - 3)  bisacodyl 5 milliGRAM(s) Oral daily PRN Constipation  ondansetron Injectable 4 milliGRAM(s) IV Push every 6 hours PRN Nausea and/or Vomiting  oxyCODONE    IR 5 milliGRAM(s) Oral every 4 hours PRN Moderate Pain (4 - 6)  oxyCODONE    IR 10 milliGRAM(s) Oral every 4 hours PRN Severe Pain (7 - 10)      ***********************************************  ASSESSMENT AND PLAN  ***********************************************    This is a case of ***    NEURO  - Admit NSICU, Q1h neuro checks / Q1h vital signs    PULM  - SpO2 goal > 92%, supplemental O2 and pulm toileting as needed    CARDIO  - BP goal:     GI  - Diet:   - Stress ulcer prophylaxis:     /RENAL  - Monitor UOP/volume status, BUN/SCr    HEME  - Maintain Hb > 7.0, PLT > ***    ID  - Monitor for infectious s/s, fever curve, leukocytosis    ENDO    10-21-22 @ 16:00       ***********************************************  ADULT NSICU PROGRESS NOTE  YAZ WILD [id number] [facility] [id number] [id number]  ***********************************************    24H INTERVAL EVENTS: s/p transphenoidal approach for resection of pituitary gland neoplasm (Drs. D'Amico, Valencia).    ROS: negative except per mentioned above in 24h interval events.      VITALS:    ICU Vital Signs Last 24 Hrs  T(C): 36.4 (21 Oct 2022 14:19), Max: 36.4 (21 Oct 2022 14:19)  T(F): 97.6 (21 Oct 2022 14:19), Max: 97.6 (21 Oct 2022 14:19)  HR: 61 (21 Oct 2022 15:00) (61 - 94)  BP: 120/66 (21 Oct 2022 15:00) (120/66 - 160/84)  BP(mean): 86 (21 Oct 2022 15:00) (86 - 115)  ABP: 131/56 (21 Oct 2022 15:00) (131/56 - 161/76)  ABP(mean): 80 (21 Oct 2022 15:00) (80 - 110)  RR: 13 (21 Oct 2022 15:00) (11 - 20)  SpO2: 99% (21 Oct 2022 15:00) (97% - 100%)    O2 Parameters below as of 21 Oct 2022 15:00  Patient On (Oxygen Delivery Method): face tent  O2 Flow (L/min): 10  O2 Concentration (%): 35            I&O's Summary    21 Oct 2022 07:01  -  21 Oct 2022 16:00  --------------------------------------------------------  IN: 325 mL / OUT: 240 mL / NET: 85 mL        EXAM:     [place] Coma Scale: 3/5/6    General: laying in bed fast asleep, nasal splints  Neuro     MS: Somnolent, full mental status exam deferred, answers questions appropriately and able to follow commands     CN: PERRL, VF FTC, gaze conjugate, EOMI, no ptosis, face symmetric, hearing grossly intact    Mot: bulk normal, power 5/5 throughout  Chest: nonlabored respirations, no adventitious lung sounds bilaterally, heart regular rate/rhythm, present S1/S2, no murmurs or rubs  Abdomen: nondistended, soft and nontender without peritoneal signs, normoactive bowel sounds  Extremities: no clubbing, well-perfused, no edema    LABORATORY DATA:                            11.4   8.03  )-----------( 254      ( 21 Oct 2022 12:30 )             34.2     10-21    138  |  106  |  21  ----------------------------<  127<H>  4.7   |  20<L>  |  1.15    Ca    8.2<L>      21 Oct 2022 12:30  Phos  3.0     10-21  Mg     1.6     10-21          MEDICATIONS  (STANDING):  ceFAZolin   IVPB 2000 milliGRAM(s) IV Intermittent every 8 hours  influenza   Vaccine 0.5 milliLiter(s) IntraMuscular once  pantoprazole    Tablet 40 milliGRAM(s) Oral before breakfast  senna 2 Tablet(s) Oral at bedtime  sodium chloride 0.65% Nasal 1 Spray(s) Both Nostrils two times a day  sodium chloride 0.9%. 1000 milliLiter(s) (75 mL/Hr) IV Continuous <Continuous>    MEDICATIONS  (PRN):  acetaminophen     Tablet .. 650 milliGRAM(s) Oral every 6 hours PRN Temp greater or equal to 38C (100.4F), Mild Pain (1 - 3)  bisacodyl 5 milliGRAM(s) Oral daily PRN Constipation  ondansetron Injectable 4 milliGRAM(s) IV Push every 6 hours PRN Nausea and/or Vomiting  oxyCODONE    IR 5 milliGRAM(s) Oral every 4 hours PRN Moderate Pain (4 - 6)  oxyCODONE    IR 10 milliGRAM(s) Oral every 4 hours PRN Severe Pain (7 - 10)      ***********************************************  ASSESSMENT AND PLAN  ***********************************************    This is a case of an elective transphenoidal approach for resection of pituitary gland neoplasm in a 61 yo M with history of HTN, hernia repair and R. shoulder surgery.    NEURO  #S/p transphenoidal approach for resection of pituitary gland neoplasm, POD#0  - Admit NSICU, Q1h neuro checks / Q1h vital signs  - Skull base precautions  - Abx for nasal splints (ancef)  - MRI pituitary  - PT/OT    PULM  - SpO2 goal > 92%, supplemental O2 and pulm toileting as needed    CARDIO  - BP goal: NORMOTENSION    GI  - Diet: ADAT  - Stress ulcer prophylaxis: while on steroids  - Bowel regimen     /RENAL  - Monitor UOP/volume status, BUN/SCr    HEME  - Maintain Hb > 7.0, PLT > 100,000    ID  - Monitor for infectious s/s, fever curve, leukocytosis  - Perioperative ancef    ENDO  - Consult, DI watch  - Cortisol level in 2 days    10-21-22 @ 16:00

## 2022-10-21 NOTE — PROGRESS NOTE ADULT - SUBJECTIVE AND OBJECTIVE BOX
NEUROSURGERY POST OP NOTE:    POD# 0 S/P endoscopic transphenoidal resection of pituitary tumor    S: Pt seen and examined at bedside in NSICU. SBP in 160s, given hydralazine 10mg x1. Pt also given fentanyl 02cuoj5 for incisional pain. Pt denies weakness of extremities.      T(C): 36.4 (10-21-22 @ 14:19), Max: 36.4 (10-21-22 @ 14:19)  HR: 79 (10-21-22 @ 14:15) (65 - 85)  BP: 128/68 (10-21-22 @ 14:15) (128/68 - 152/82)  RR: 13 (10-21-22 @ 14:15) (11 - 20)  SpO2: 100% (10-21-22 @ 14:15) (98% - 100%)      10-21-22 @ 07:01  -  10-21-22 @ 14:19  --------------------------------------------------------  IN: 250 mL / OUT: 90 mL / NET: 160 mL    acetaminophen     Tablet .. 650 milliGRAM(s) Oral every 6 hours PRN  bisacodyl 5 milliGRAM(s) Oral daily PRN  ceFAZolin   IVPB 2000 milliGRAM(s) IV Intermittent every 8 hours  influenza   Vaccine 0.5 milliLiter(s) IntraMuscular once  ondansetron Injectable 4 milliGRAM(s) IV Push every 6 hours PRN  oxyCODONE    IR 5 milliGRAM(s) Oral every 4 hours PRN  oxyCODONE    IR 10 milliGRAM(s) Oral every 4 hours PRN  pantoprazole    Tablet 40 milliGRAM(s) Oral before breakfast  senna 2 Tablet(s) Oral at bedtime  sodium chloride 0.65% Nasal 1 Spray(s) Both Nostrils two times a day  sodium chloride 0.9%. 1000 milliLiter(s) IV Continuous <Continuous>    Exam:  General: NAD, pt is comfortably sitting up in bed, on room air  Cardiovascular: RRR, normal S1 and S2   Respiratory: lungs CTAB, no wheezing, rhonchi, or crackles   GI: normoactive BS to auscultation, abd soft, NTND   Neuro: AAOx3, FC, speech fluent and clear  CNII-CXII: PERRL 3mm briskly reactive, EOMI b/l, face symmetric, tongue midline  VF intact  Motor: MATHIS x4 spontaneously, 5/5 strength in all extremities throughout b/l   Sensation intact to light touch throughout  Extremities: distal pulses 2+ x4 symmetric  Wound/incision: B/L splints in place C/D/I    Assessment: 61y/o M with PMHx HTN, athritis, endorses continued left side blurriness since September 2020. He went to an optometrist August 2022 for which he was sent to the ER for further workup. He was diagnosed with pituitary adenoma. He saw endocrinologist Dr. Jim in [place] 2 months ago and states labs were WNL. Pt is now s/p endoscopic transphenoidal resection of pituitary tumor 10/21/22    Plan:  PLAN:  Neuro:  -neuro/vitals q1h  -DI watch  -skull base precautions  -nasal splints b/l per ENT  -postop MRI sella    Cardiac:   -SBP goal 100-140    PulM:  -room air  -No NC    Renal:  -IVF until adeqaute oral intake  -strict I's and O's  -fowler in place    GI:  -ADAT  -bowel regimen  -PPI while on steroids    Endo:  -ISS  -endocrinology consulted, f/u recs    -ID:  -postop ancef while nasal splints in place per ENT    DispoL  ICU status, full code, pending PT/OT    D/w Dr. D'Amico and Dr. Mccarthy

## 2022-10-21 NOTE — PRE-ANESTHESIA EVALUATION ADULT - NSANTHOSAYNRD_GEN_A_CORE
No. MANDEEP screening performed.  STOP BANG Legend: 0-2 = LOW Risk; 3-4 = INTERMEDIATE Risk; 5-8 = HIGH Risk

## 2022-10-21 NOTE — BRIEF OPERATIVE NOTE - OPERATION/FINDINGS
Transphenoidal approach to pituitary with right middle turbinectomy. Joint procedure with neurosurgery. Transphenoidal approach to pituitary with right middle turbinectomy. Joint procedure with neurosurgery. Defect closed with mucosal graft, surgicel, and gel foam. Bilateral nasal splints sutured in place.

## 2022-10-21 NOTE — PROGRESS NOTE ADULT - ASSESSMENT
ASSESSMENT: TSP resection of pituitary adenoma, POD 0    NEURO:  CT Head in AM, MRI post-op, Surgical drains per NSGY, Pain control  Activity: [] OOB as tolerated [] Bedrest [x] PT [x] OT [] PMNR    PULM:  Pituitary precautions (no incentive spirometry, no straws, no BIPAP)    CV:  -150mmHg, d/c a-line in AM if hemodynamically stable  c/w home lisinopril    RENAL:  Randall for strict I+Os  IVF NS @75cc/hr  IVF until good PO intake  Drink to thirst    GI:  Diet: Dysphagia screen and then advance diet as tolerated  GI prophylaxis [] not indicated [x] PPI [] other:  Bowel regimen [] colace [x] senna [] other:    ENDO:   Goal euglycemia (-180)  Pituitary labs  Monitor for DI    HEME/ONC:  VTE prophylaxis: [x] SCDs [] chemoprophylaxis [] hold chemoprophylaxis due to: [] high risk of DVT/PE on admission due to:    ID:  Mia-op antibiotics    MISC:    SOCIAL/FAMILY:  [] awaiting [] updated at bedside [] family meeting    CODE STATUS:  [] Full Code [] DNR [] DNI [] Palliative/Comfort Care    DISPOSITION:  [] ICU [] Stroke Unit [] Floor [] EMU [] RCU [] PCU    [] Patient is at high risk of neurologic deterioration/death due to:     Time seen:  Time spent: ___ [] critical care minutes ASSESSMENT: TSP resection of pituitary adenoma, POD 0    NEURO:  CT Head in AM, MRI post-op,  Pain control  Activity: [] OOB as tolerated [] Bedrest [x] PT [x] OT [] PMNR    PULM:  Pituitary precautions (no incentive spirometry, no straws, no BIPAP)    CV:  -150mmHg, d/c a-line in AM if hemodynamically stable  c/w home lisinopril    RENAL:  Randall for strict I+Os  IVF NS @75cc/hr  IVF until good PO intake  Drink to thirst    GI:  Diet: Dysphagia screen and then advance diet as tolerated  GI prophylaxis [] not indicated [x] PPI [] other:  Bowel regimen [] colace [x] senna [] other:    ENDO:   Goal euglycemia (-180)  Pituitary labs  Monitor for DI    HEME/ONC:  VTE prophylaxis: [x] SCDs [] chemoprophylaxis [] hold chemoprophylaxis due to fresh post op    ID:  post -op antibiotics      CODE STATUS:  [x] Full Code [] DNR [] DNI [] Palliative/Comfort Care    DISPOSITION:  [x] ICU [] Stroke Unit [] Floor [] EMU [] RCU [] PCU    [x] Patient is at high risk of neurologic deterioration/death due to: potential DI, CSF leask    Time seen:  Time spent: 35 critical care minutes

## 2022-10-21 NOTE — PROGRESS NOTE ADULT - SUBJECTIVE AND OBJECTIVE BOX
INTERVAL HISTORY: HPI:  Presented for evaluation 8/17/22 and endorsed he went to optometrist for left vision changes Sept 2020 for which he was sent to ER for further workup. He was dx with pituitary adenoma. He endorses difficulty seeing neurosurgeon s/t insurance issues. His most recent MRI done 5/21/22 with report unchanged.      He endorses continued left eye left side blurriness. He denies tunnel vision. Notes intermittent left eye double vision.      He denies dizziness, headaches, abnormal growth of hands/feet, abnormal growth of facial features, significant unexplained weight loss/gain, abnormal hair growth/loss, or galactorrhea.     He saw endocrinologist Dr. Jim in [place] 2 months ago and states labs were WNL, he does not have report with him for today's visit.      Patient referred to see neuro-opth Brandon Black and plan made for him to repeat MRI pituitary, CT max/face.      Patient returned 9/23/22 and MRI Sella done 9/11/22 reviewed. Plan made for surgical intervention 10/21/22.     Patient again referred to see neuro- opth Brandon Black and plan made for pre-op CT max/face and see ENT Dr. Birmingham.     TODAY patient present for planned endoscopic endonasal transphenoidal resection of pituitary tumor.     PCP: Dr. Isela Powell      Endo: Dr. Mey Jim  (21 Oct 2022 07:15)      MEDICATIONS  (STANDING):  ceFAZolin   IVPB 2000 milliGRAM(s) IV Intermittent every 8 hours  dextrose 5%. 1000 milliLiter(s) (50 mL/Hr) IV Continuous <Continuous>  dextrose 5%. 1000 milliLiter(s) (100 mL/Hr) IV Continuous <Continuous>  dextrose 50% Injectable 25 Gram(s) IV Push once  dextrose 50% Injectable 12.5 Gram(s) IV Push once  dextrose 50% Injectable 25 Gram(s) IV Push once  enalapril 5 milliGRAM(s) Oral every 12 hours  glucagon  Injectable 1 milliGRAM(s) IntraMuscular once  influenza   Vaccine 0.5 milliLiter(s) IntraMuscular once  insulin lispro (ADMELOG) corrective regimen sliding scale   SubCutaneous Before meals and at bedtime  pantoprazole    Tablet 40 milliGRAM(s) Oral before breakfast  senna 2 Tablet(s) Oral at bedtime  sodium chloride 0.65% Nasal 1 Spray(s) Both Nostrils three times a day  sodium chloride 0.9%. 1000 milliLiter(s) (75 mL/Hr) IV Continuous <Continuous>    MEDICATIONS  (PRN):  acetaminophen     Tablet .. 650 milliGRAM(s) Oral every 6 hours PRN Temp greater or equal to 38C (100.4F), Mild Pain (1 - 3)  artificial tears (preservative free) Ophthalmic Solution 1 Drop(s) Both EYES every 4 hours PRN Dry Eyes  bisacodyl 5 milliGRAM(s) Oral daily PRN Constipation  dextrose Oral Gel 15 Gram(s) Oral once PRN Blood Glucose LESS THAN 70 milliGRAM(s)/deciliter  ondansetron Injectable 4 milliGRAM(s) IV Push every 6 hours PRN Nausea and/or Vomiting  oxyCODONE    IR 5 milliGRAM(s) Oral every 4 hours PRN Moderate Pain (4 - 6)  oxyCODONE    IR 10 milliGRAM(s) Oral every 4 hours PRN Severe Pain (7 - 10)      Drug Dosing Weight  Height (cm): 165.1 (21 Oct 2022 07:38)  Weight (kg): 61.4 (21 Oct 2022 07:38)  BMI (kg/m2): 22.5 (21 Oct 2022 07:38)  BSA (m2): 1.68 (21 Oct 2022 07:38)    PAST MEDICAL & SURGICAL HISTORY:  Arthritis      Seasonal allergies      HTN (hypertension)      H/O hernia repair      H/O shoulder surgery  right          REVIEW OF SYSTEMS: [ ] Unable to Assess due to neurologic exam   [ ] All ROS addressed below are non-contributory, except:  Neuro: [ ] Headache [ ] Back pain [ ] Numbness [ ] Weakness [ ] Ataxia [ ] Dizziness [ ] Aphasia [ ] Dysarthria [ ] Visual disturbance  Resp: [ ] Shortness of breath/dyspnea, [ ] Orthopnea [ ] Cough  CV: [ ] Chest pain [ ] Palpitation [ ] Lightheadedness [ ] Syncope  Renal: [ ] Thirst [ ] Edema  GI: [ ] Nausea [ ] Emesis [ ] Abdominal pain [ ] Constipation [ ] Diarrhea  Hem: [ ] Hematemesis [ ] bright red blood per rectum  ID: [ ] Fever [ ] Chills [ ] Dysuria  ENT: [ ] Rhinorrhea    PHYSICAL EXAM:    General: No Acute Distress     Neurological: Awake, alert oriented to person, place and time, Following Commands, PERRL, EOMI, Face Symmetrical, Speech Fluent, Moving all extremities, Muscle Strength normal in all four extremities, No Drift, Sensation to Light Touch Intact    Pulmonary: Clear to Auscultation, No Rales, No Rhonchi, No Wheezes     Cardiovascular: S1, S2, Regular Rate and Rhythm     Gastrointestinal: Soft, Nontender, Nondistended     Extremities: No calf tenderness     Incision:     ICU Vital Signs Last 24 Hrs  T(C): 37 (21 Oct 2022 21:49), Max: 37 (21 Oct 2022 21:49)  T(F): 98.6 (21 Oct 2022 21:49), Max: 98.6 (21 Oct 2022 21:49)  HR: 75 (21 Oct 2022 21:00) (61 - 94)  BP: 140/62 (21 Oct 2022 21:00) (118/71 - 160/84)  BP(mean): 94 (21 Oct 2022 21:00) (86 - 115)  ABP: 155/68 (21 Oct 2022 21:00) (131/56 - 161/76)  ABP(mean): 97 (21 Oct 2022 21:00) (80 - 110)  RR: 15 (21 Oct 2022 21:00) (11 - 20)  SpO2: 99% (21 Oct 2022 21:00) (97% - 100%)    O2 Parameters below as of 21 Oct 2022 21:00  Patient On (Oxygen Delivery Method): face tent  O2 Flow (L/min): 10  O2 Concentration (%): 35        I&O's Detail    21 Oct 2022 07:01  -  21 Oct 2022 21:54  --------------------------------------------------------  IN:    IV PiggyBack: 100 mL    sodium chloride 0.9%: 675 mL  Total IN: 775 mL    OUT:    Indwelling Catheter - Urethral (mL): 895 mL  Total OUT: 895 mL    Total NET: -120 mL              LABS:  CBC Full  -  ( 21 Oct 2022 12:30 )  WBC Count : 8.03 K/uL  RBC Count : 3.85 M/uL  Hemoglobin : 11.4 g/dL  Hematocrit : 34.2 %  Platelet Count - Automated : 254 K/uL  Mean Cell Volume : 88.8 fl  Mean Cell Hemoglobin : 29.6 pg  Mean Cell Hemoglobin Concentration : 33.3 gm/dL  Auto Neutrophil # : x  Auto Lymphocyte # : x  Auto Monocyte # : x  Auto Eosinophil # : x  Auto Basophil # : x  Auto Neutrophil % : x  Auto Lymphocyte % : x  Auto Monocyte % : x  Auto Eosinophil % : x  Auto Basophil % : x    10-21    139  |  105  |  20  ----------------------------<  153<H>  4.1   |  22  |  1.13    Ca    8.1<L>      21 Oct 2022 16:00  Phos  3.0     10-21  Mg     1.6     10-21            RADIOLOGY & ADDITIONAL STUDIES:   INTERVAL HISTORY: HPI:  Presented for evaluation 8/17/22 and endorsed he went to optometrist for left vision changes Sept 2020 for which he was sent to ER for further workup. He was dx with pituitary adenoma. He endorses difficulty seeing neurosurgeon s/t insurance issues. His most recent MRI done 5/21/22 with report unchanged.      MEDICATIONS  (STANDING):  ceFAZolin   IVPB 2000 milliGRAM(s) IV Intermittent every 8 hours  dextrose 5%. 1000 milliLiter(s) (50 mL/Hr) IV Continuous <Continuous>  dextrose 5%. 1000 milliLiter(s) (100 mL/Hr) IV Continuous <Continuous>  dextrose 50% Injectable 25 Gram(s) IV Push once  dextrose 50% Injectable 12.5 Gram(s) IV Push once  dextrose 50% Injectable 25 Gram(s) IV Push once  enalapril 5 milliGRAM(s) Oral every 12 hours  glucagon  Injectable 1 milliGRAM(s) IntraMuscular once  influenza   Vaccine 0.5 milliLiter(s) IntraMuscular once  insulin lispro (ADMELOG) corrective regimen sliding scale   SubCutaneous Before meals and at bedtime  pantoprazole    Tablet 40 milliGRAM(s) Oral before breakfast  senna 2 Tablet(s) Oral at bedtime  sodium chloride 0.65% Nasal 1 Spray(s) Both Nostrils three times a day  sodium chloride 0.9%. 1000 milliLiter(s) (75 mL/Hr) IV Continuous <Continuous>    MEDICATIONS  (PRN):  acetaminophen     Tablet .. 650 milliGRAM(s) Oral every 6 hours PRN Temp greater or equal to 38C (100.4F), Mild Pain (1 - 3)  artificial tears (preservative free) Ophthalmic Solution 1 Drop(s) Both EYES every 4 hours PRN Dry Eyes  bisacodyl 5 milliGRAM(s) Oral daily PRN Constipation  dextrose Oral Gel 15 Gram(s) Oral once PRN Blood Glucose LESS THAN 70 milliGRAM(s)/deciliter  ondansetron Injectable 4 milliGRAM(s) IV Push every 6 hours PRN Nausea and/or Vomiting  oxyCODONE    IR 5 milliGRAM(s) Oral every 4 hours PRN Moderate Pain (4 - 6)  oxyCODONE    IR 10 milliGRAM(s) Oral every 4 hours PRN Severe Pain (7 - 10)      Drug Dosing Weight  Height (cm): 165.1 (21 Oct 2022 07:38)  Weight (kg): 61.4 (21 Oct 2022 07:38)  BMI (kg/m2): 22.5 (21 Oct 2022 07:38)  BSA (m2): 1.68 (21 Oct 2022 07:38)    PAST MEDICAL & SURGICAL HISTORY:  Arthritis  Seasonal allergies  HTN (hypertension)  H/O hernia repair  H/O shoulder surgery  right          REVIEW OF SYSTEMS: [ ] Unable to Assess due to neurologic exam   [ ] All ROS addressed below are non-contributory, except:  Neuro: [x] Headache [ ] Back pain [ ] Numbness [ ] Weakness [ ] Ataxia [ ] Dizziness [ ] Aphasia [ ] Dysarthria [ ] Visual disturbance  Resp: [ ] Shortness of breath/dyspnea, [ ] Orthopnea [ ] Cough  CV: [ ] Chest pain [ ] Palpitation [ ] Lightheadedness [ ] Syncope  Renal: [ ] Thirst [ ] Edema  GI: [ ] Nausea [ ] Emesis [ ] Abdominal pain [ ] Constipation [ ] Diarrhea  Hem: [ ] Hematemesis [ ] bright red blood per rectum  ID: [ ] Fever [ ] Chills [ ] Dysuria  ENT: [ ] Rhinorrhea    PHYSICAL EXAM:    General: No Acute Distress   Neurological: Awake, alert oriented to person, place and time, Following Commands, PERRL, EOMI, Face Symmetrical, Speech Fluent, Moving all extremities, Muscle Strength normal in all four extremities, No Drift, Sensation to Light Touch Intact,  visual fields intact  Pulmonary: Clear to Auscultation, No Rales, No Rhonchi, No Wheezes   Cardiovascular: S1, S2, Regular Rate and Rhythm   Gastrointestinal: Soft, Nontender, Nondistended   Extremities: No calf tenderness     ICU Vital Signs Last 24 Hrs  T(C): 37 (21 Oct 2022 21:49), Max: 37 (21 Oct 2022 21:49)  T(F): 98.6 (21 Oct 2022 21:49), Max: 98.6 (21 Oct 2022 21:49)  HR: 75 (21 Oct 2022 21:00) (61 - 94)  BP: 140/62 (21 Oct 2022 21:00) (118/71 - 160/84)  BP(mean): 94 (21 Oct 2022 21:00) (86 - 115)  ABP: 155/68 (21 Oct 2022 21:00) (131/56 - 161/76)  ABP(mean): 97 (21 Oct 2022 21:00) (80 - 110)  RR: 15 (21 Oct 2022 21:00) (11 - 20)  SpO2: 99% (21 Oct 2022 21:00) (97% - 100%)    O2 Parameters below as of 21 Oct 2022 21:00  Patient On (Oxygen Delivery Method): face tent  O2 Flow (L/min): 10  O2 Concentration (%): 35        I&O's Detail    21 Oct 2022 07:01  -  21 Oct 2022 21:54  --------------------------------------------------------  IN:    IV PiggyBack: 100 mL    sodium chloride 0.9%: 675 mL  Total IN: 775 mL    OUT:    Indwelling Catheter - Urethral (mL): 895 mL  Total OUT: 895 mL    Total NET: -120 mL              LABS:  CBC Full  -  ( 21 Oct 2022 12:30 )  WBC Count : 8.03 K/uL  RBC Count : 3.85 M/uL  Hemoglobin : 11.4 g/dL  Hematocrit : 34.2 %  Platelet Count - Automated : 254 K/uL  Mean Cell Volume : 88.8 fl  Mean Cell Hemoglobin : 29.6 pg  Mean Cell Hemoglobin Concentration : 33.3 gm/dL  Auto Neutrophil # : x  Auto Lymphocyte # : x  Auto Monocyte # : x  Auto Eosinophil # : x  Auto Basophil # : x  Auto Neutrophil % : x  Auto Lymphocyte % : x  Auto Monocyte % : x  Auto Eosinophil % : x  Auto Basophil % : x    10-21    139  |  105  |  20  ----------------------------<  153<H>  4.1   |  22  |  1.13    Ca    8.1<L>      21 Oct 2022 16:00  Phos  3.0     10-21  Mg     1.6     10-21            RADIOLOGY & ADDITIONAL STUDIES:

## 2022-10-21 NOTE — BRIEF OPERATIVE NOTE - NSICDXBRIEFPROCEDURE_GEN_ALL_CORE_FT
PROCEDURES:  Total surgical removal of neoplasm of pituitary gland by endoscopic endonasal transsphenoidal approach 21-Oct-2022 10:56:02  Moise Shelton

## 2022-10-21 NOTE — CONSULT NOTE ADULT - ATTENDING COMMENTS
Pt seen on rounds this afternoon.  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  Evaluation and surgery were suggested but the pt did not follow up due to insurance issues.  He was finally evaluated by an endocrinologist 2 months ago who did a full hormonal work-up which was mostly WNL.  HIs most recent MRI showed the 4 cm adenoma with probable cavernous sinus invasion, optic nerve compression, and encasement of both ICAs.  He is now s/p trans-sphenoidal resection earlier today.  Has no symptoms suggestive of hormonal secretion by the tumor.  The only symptoms he does mention (aside from blurred vision in his left eye and ?? diplopia) are a decreased frequency of shaving and a decrease in libido and erections  On exam he has a paucity of secondary sexual hair but says that he has never had hair on his chest or abdomen.  Despite his report suggesting decreased beard growth, he has obvious facial hair on exam and says that he shaved yesterday  He does not appear acromegalic or Cushinoid.  --Received 4 mg dexamethasone in OR and is due to receiv1 mg tomorrow  --Urine output is not excessive, and urine in catheter bag appears moderately concentrated.  Serum Na is 139  --To monitor for DI.    --Repeat cortisol on 10/23  --Would also do cortisol and free T4 on 10/24

## 2022-10-22 LAB
A1C WITH ESTIMATED AVERAGE GLUCOSE RESULT: 5.3 % — SIGNIFICANT CHANGE UP (ref 4–5.6)
ANION GAP SERPL CALC-SCNC: 12 MMOL/L — SIGNIFICANT CHANGE UP (ref 5–17)
ANION GAP SERPL CALC-SCNC: 8 MMOL/L — SIGNIFICANT CHANGE UP (ref 5–17)
ANION GAP SERPL CALC-SCNC: 9 MMOL/L — SIGNIFICANT CHANGE UP (ref 5–17)
BUN SERPL-MCNC: 14 MG/DL — SIGNIFICANT CHANGE UP (ref 7–23)
BUN SERPL-MCNC: 17 MG/DL — SIGNIFICANT CHANGE UP (ref 7–23)
BUN SERPL-MCNC: 18 MG/DL — SIGNIFICANT CHANGE UP (ref 7–23)
BUN SERPL-MCNC: 19 MG/DL — SIGNIFICANT CHANGE UP (ref 7–23)
CALCIUM SERPL-MCNC: 7.7 MG/DL — LOW (ref 8.4–10.5)
CALCIUM SERPL-MCNC: 7.9 MG/DL — LOW (ref 8.4–10.5)
CALCIUM SERPL-MCNC: 8.5 MG/DL — SIGNIFICANT CHANGE UP (ref 8.4–10.5)
CALCIUM SERPL-MCNC: 8.8 MG/DL — SIGNIFICANT CHANGE UP (ref 8.4–10.5)
CHLORIDE SERPL-SCNC: 101 MMOL/L — SIGNIFICANT CHANGE UP (ref 96–108)
CHLORIDE SERPL-SCNC: 104 MMOL/L — SIGNIFICANT CHANGE UP (ref 96–108)
CHLORIDE SERPL-SCNC: 110 MMOL/L — HIGH (ref 96–108)
CO2 SERPL-SCNC: 22 MMOL/L — SIGNIFICANT CHANGE UP (ref 22–31)
CO2 SERPL-SCNC: 24 MMOL/L — SIGNIFICANT CHANGE UP (ref 22–31)
CO2 SERPL-SCNC: 26 MMOL/L — SIGNIFICANT CHANGE UP (ref 22–31)
CO2 SERPL-SCNC: 26 MMOL/L — SIGNIFICANT CHANGE UP (ref 22–31)
CREAT SERPL-MCNC: 1.14 MG/DL — SIGNIFICANT CHANGE UP (ref 0.5–1.3)
CREAT SERPL-MCNC: 1.21 MG/DL — SIGNIFICANT CHANGE UP (ref 0.5–1.3)
CREAT SERPL-MCNC: 1.38 MG/DL — HIGH (ref 0.5–1.3)
EGFR: 57 ML/MIN/1.73M2 — LOW
EGFR: 67 ML/MIN/1.73M2 — SIGNIFICANT CHANGE UP
EGFR: 72 ML/MIN/1.73M2 — SIGNIFICANT CHANGE UP
ESTIMATED AVERAGE GLUCOSE: 105 MG/DL — SIGNIFICANT CHANGE UP (ref 68–114)
GLUCOSE BLDC GLUCOMTR-MCNC: 110 MG/DL — HIGH (ref 70–99)
GLUCOSE BLDC GLUCOMTR-MCNC: 120 MG/DL — HIGH (ref 70–99)
GLUCOSE BLDC GLUCOMTR-MCNC: 138 MG/DL — HIGH (ref 70–99)
GLUCOSE SERPL-MCNC: 111 MG/DL — HIGH (ref 70–99)
GLUCOSE SERPL-MCNC: 114 MG/DL — HIGH (ref 70–99)
GLUCOSE SERPL-MCNC: 134 MG/DL — HIGH (ref 70–99)
GLUCOSE SERPL-MCNC: 162 MG/DL — HIGH (ref 70–99)
HCT VFR BLD CALC: 32.4 % — LOW (ref 39–50)
HCV AB S/CO SERPL IA: 0.04 S/CO — SIGNIFICANT CHANGE UP
HCV AB SERPL-IMP: SIGNIFICANT CHANGE UP
HGB BLD-MCNC: 11 G/DL — LOW (ref 13–17)
MAGNESIUM SERPL-MCNC: 2.1 MG/DL — SIGNIFICANT CHANGE UP (ref 1.6–2.6)
MCHC RBC-ENTMCNC: 29.6 PG — SIGNIFICANT CHANGE UP (ref 27–34)
MCHC RBC-ENTMCNC: 34 GM/DL — SIGNIFICANT CHANGE UP (ref 32–36)
MCV RBC AUTO: 87.3 FL — SIGNIFICANT CHANGE UP (ref 80–100)
NRBC # BLD: 0 /100 WBCS — SIGNIFICANT CHANGE UP (ref 0–0)
OSMOLALITY UR: 345 MOSM/KG — SIGNIFICANT CHANGE UP (ref 300–900)
PHOSPHATE SERPL-MCNC: 2.8 MG/DL — SIGNIFICANT CHANGE UP (ref 2.5–4.5)
PLATELET # BLD AUTO: 252 K/UL — SIGNIFICANT CHANGE UP (ref 150–400)
POTASSIUM SERPL-MCNC: 4.1 MMOL/L — SIGNIFICANT CHANGE UP (ref 3.5–5.3)
POTASSIUM SERPL-MCNC: 4.6 MMOL/L — SIGNIFICANT CHANGE UP (ref 3.5–5.3)
POTASSIUM SERPL-MCNC: 4.7 MMOL/L — SIGNIFICANT CHANGE UP (ref 3.5–5.3)
POTASSIUM SERPL-MCNC: 5 MMOL/L — SIGNIFICANT CHANGE UP (ref 3.5–5.3)
POTASSIUM SERPL-SCNC: 4.1 MMOL/L — SIGNIFICANT CHANGE UP (ref 3.5–5.3)
POTASSIUM SERPL-SCNC: 4.6 MMOL/L — SIGNIFICANT CHANGE UP (ref 3.5–5.3)
POTASSIUM SERPL-SCNC: 4.7 MMOL/L — SIGNIFICANT CHANGE UP (ref 3.5–5.3)
POTASSIUM SERPL-SCNC: 5 MMOL/L — SIGNIFICANT CHANGE UP (ref 3.5–5.3)
RBC # BLD: 3.71 M/UL — LOW (ref 4.2–5.8)
RBC # FLD: 12.8 % — SIGNIFICANT CHANGE UP (ref 10.3–14.5)
SODIUM SERPL-SCNC: 135 MMOL/L — SIGNIFICANT CHANGE UP (ref 135–145)
SODIUM SERPL-SCNC: 137 MMOL/L — SIGNIFICANT CHANGE UP (ref 135–145)
SODIUM SERPL-SCNC: 144 MMOL/L — SIGNIFICANT CHANGE UP (ref 135–145)
SODIUM SERPL-SCNC: 145 MMOL/L — SIGNIFICANT CHANGE UP (ref 135–145)
SODIUM UR-SCNC: <20 MMOL/L — SIGNIFICANT CHANGE UP
SP GR SPEC: 1.01 — SIGNIFICANT CHANGE UP (ref 1–1.03)
SP GR SPEC: 1.01 — SIGNIFICANT CHANGE UP (ref 1–1.03)
SP GR SPEC: <=1.005 — SIGNIFICANT CHANGE UP (ref 1–1.03)
WBC # BLD: 10.37 K/UL — SIGNIFICANT CHANGE UP (ref 3.8–10.5)
WBC # FLD AUTO: 10.37 K/UL — SIGNIFICANT CHANGE UP (ref 3.8–10.5)

## 2022-10-22 PROCEDURE — 99233 SBSQ HOSP IP/OBS HIGH 50: CPT

## 2022-10-22 PROCEDURE — 70553 MRI BRAIN STEM W/O & W/DYE: CPT | Mod: 26

## 2022-10-22 RX ORDER — ENOXAPARIN SODIUM 100 MG/ML
40 INJECTION SUBCUTANEOUS EVERY 24 HOURS
Refills: 0 | Status: DISCONTINUED | OUTPATIENT
Start: 2022-10-22 | End: 2022-10-24

## 2022-10-22 RX ORDER — POLYETHYLENE GLYCOL 3350 17 G/17G
17 POWDER, FOR SOLUTION ORAL DAILY
Refills: 0 | Status: DISCONTINUED | OUTPATIENT
Start: 2022-10-23 | End: 2022-10-24

## 2022-10-22 RX ORDER — SODIUM CHLORIDE 9 MG/ML
1000 INJECTION, SOLUTION INTRAVENOUS ONCE
Refills: 0 | Status: COMPLETED | OUTPATIENT
Start: 2022-10-22 | End: 2022-10-22

## 2022-10-22 RX ADMIN — OXYCODONE HYDROCHLORIDE 10 MILLIGRAM(S): 5 TABLET ORAL at 03:58

## 2022-10-22 RX ADMIN — ENOXAPARIN SODIUM 40 MILLIGRAM(S): 100 INJECTION SUBCUTANEOUS at 21:31

## 2022-10-22 RX ADMIN — Medication 100 MILLIGRAM(S): at 14:18

## 2022-10-22 RX ADMIN — PANTOPRAZOLE SODIUM 40 MILLIGRAM(S): 20 TABLET, DELAYED RELEASE ORAL at 06:11

## 2022-10-22 RX ADMIN — Medication 1 SPRAY(S): at 21:41

## 2022-10-22 RX ADMIN — Medication 100 MILLIGRAM(S): at 04:05

## 2022-10-22 RX ADMIN — OXYCODONE HYDROCHLORIDE 10 MILLIGRAM(S): 5 TABLET ORAL at 04:09

## 2022-10-22 RX ADMIN — Medication 100 MILLIGRAM(S): at 21:41

## 2022-10-22 RX ADMIN — Medication 1 SPRAY(S): at 14:18

## 2022-10-22 RX ADMIN — Medication 5 MILLIGRAM(S): at 05:30

## 2022-10-22 RX ADMIN — SODIUM CHLORIDE 2000 MILLILITER(S): 9 INJECTION, SOLUTION INTRAVENOUS at 10:24

## 2022-10-22 RX ADMIN — Medication 1 SPRAY(S): at 05:16

## 2022-10-22 NOTE — PHYSICAL THERAPY INITIAL EVALUATION ADULT - GAIT DEVIATIONS NOTED, PT EVAL
Demo fairly steady gait, no LOB observed, good navigation of room obstacles/decreased velocity of limb motion/decreased step length/decreased stride length

## 2022-10-22 NOTE — PHYSICAL THERAPY INITIAL EVALUATION ADULT - MODALITIES TREATMENT COMMENTS
CN Testing: B/L Frontalis intact; B/L buccinator intact; smile symmetrical; tongue protrusion at midline; B/L eyes open/close intact; Shoulder elevation: intact bilaterally; Vision H-Test: bilateral tracking and smooth pursuit intact; Convergence/Divergence: intact; Vision Quadrant Test: intact bilaterally.

## 2022-10-22 NOTE — PROGRESS NOTE ADULT - SUBJECTIVE AND OBJECTIVE BOX
HPI:  Presented for evaluation 8/17/22 and endorsed he went to optometrist for left vision changes Sept 2020 for which he was sent to ER for further workup. He was dx with pituitary adenoma. He endorses difficulty seeing neurosurgeon s/t insurance issues. His most recent MRI done 5/21/22 with report unchanged.      He endorses continued left eye left side blurriness. He denies tunnel vision. Notes intermittent left eye double vision.      He denies dizziness, headaches, abnormal growth of hands/feet, abnormal growth of facial features, significant unexplained weight loss/gain, abnormal hair growth/loss, or galactorrhea.     He saw endocrinologist Dr. Jim in [place] 2 months ago and states labs were WNL, he does not have report with him for today's visit.      Patient referred to see neuro-opth Brandon Black and plan made for him to repeat MRI pituitary, CT max/face.      Patient returned 9/23/22 and MRI Sella done 9/11/22 reviewed. Plan made for surgical intervention 10/21/22.     Patient again referred to see neuro- opth Brandon Black and plan made for pre-op CT max/face and see ENT Dr. Birmingham.     TODAY patient present for planned endoscopic endonasal transphenoidal resection of pituitary tumor.     PCP: Dr. Isela Powell      Endo: Dr. Mey Jim  (21 Oct 2022 07:15)        Hospital Course:   10/21: POD0 TSS pituitary tumor resection. TED cordova. catherine consulted  10/22: POD1, NOMAN overnight, NA and UOP stable.        Vital Signs Last 24 Hrs  T(C): 37 (21 Oct 2022 21:49), Max: 37 (21 Oct 2022 21:49)  T(F): 98.6 (21 Oct 2022 21:49), Max: 98.6 (21 Oct 2022 21:49)  HR: 71 (22 Oct 2022 02:00) (61 - 94)  BP: 122/61 (22 Oct 2022 02:00) (118/71 - 160/84)  BP(mean): 86 (22 Oct 2022 02:00) (86 - 115)  RR: 14 (22 Oct 2022 02:00) (11 - 20)  SpO2: 100% (22 Oct 2022 02:00) (97% - 100%)    Parameters below as of 22 Oct 2022 02:00  Patient On (Oxygen Delivery Method): face tent  O2 Flow (L/min): 10  O2 Concentration (%): 35    I&O's Detail    21 Oct 2022 07:01  -  22 Oct 2022 02:32  --------------------------------------------------------  IN:    IV PiggyBack: 100 mL    sodium chloride 0.9%: 1050 mL  Total IN: 1150 mL    OUT:    Indwelling Catheter - Urethral (mL): 1500 mL  Total OUT: 1500 mL    Total NET: -350 mL        I&O's Summary    21 Oct 2022 07:01  -  22 Oct 2022 02:32  --------------------------------------------------------  IN: 1150 mL / OUT: 1500 mL / NET: -350 mL        PHYSICAL EXAM:  General: NAD, pt is comfortably sitting up in bed, on room air  Cardiovascular: RRR, normal S1 and S2   Respiratory: lungs CTAB, no wheezing, rhonchi, or crackles   GI: normoactive BS to auscultation, abd soft, NTND   Neuro: AAOx3, FC, speech fluent and clear  CNII-CXII: PERRL 3mm briskly reactive, EOMI b/l, face symmetric, tongue midline  VF intact  Motor: MATHIS x4 spontaneously, 5/5 strength in all extremities throughout b/l   Sensation intact to light touch throughout  Extremities: distal pulses 2+ x4 symmetric  Wound/incision: B/L splints in place C/D/I        TUBES/LINES:  [] CVC  [] A-line  [] Lumbar Drain  [] Ventriculostomy  [] HUEY  [] Fowler  [] NGT   [] Other    DIET:  [] NPO  [] Mechanical  [] Tube feeds    LABS:                        11.4   8.03  )-----------( 254      ( 21 Oct 2022 12:30 )             34.2     10-21    139  |  105  |  20  ----------------------------<  153<H>  4.1   |  22  |  1.13    Ca    8.1<L>      21 Oct 2022 16:00  Phos  3.0     10-21  Mg     1.6     10-21              CAPILLARY BLOOD GLUCOSE      POCT Blood Glucose.: 164 mg/dL (21 Oct 2022 21:24)  POCT Blood Glucose.: 120 mg/dL (21 Oct 2022 16:29)      Drug Levels: [] N/A    CSF Analysis: [] N/A      Allergies    No Known Allergies    Intolerances        Home Medications:  Aspir 81 oral delayed release tablet: 1 tab(s) orally once a day (21 Oct 2022 06:28)  atorvastatin: orally once a day (21 Oct 2022 06:28)  enalapril 5 mg oral tablet: orally 2 times a day (21 Oct 2022 06:28)   mg oral tablet: 1 tab(s) orally every 6 hours, As Needed (21 Oct 2022 06:28)  Tylenol 500 mg oral tablet: 2 tab(s) orally every 6 hours, As Needed (21 Oct 2022 06:28)      MEDICATIONS:  Antibiotics:  ceFAZolin   IVPB 2000 milliGRAM(s) IV Intermittent every 8 hours    Neuro:  acetaminophen     Tablet .. 650 milliGRAM(s) Oral every 6 hours PRN  ondansetron Injectable 4 milliGRAM(s) IV Push every 6 hours PRN  oxyCODONE    IR 5 milliGRAM(s) Oral every 4 hours PRN  oxyCODONE    IR 10 milliGRAM(s) Oral every 4 hours PRN    Anticoagulation:    OTHER:  artificial tears (preservative free) Ophthalmic Solution 1 Drop(s) Both EYES every 4 hours PRN  bisacodyl 5 milliGRAM(s) Oral daily PRN  dexAMETHasone     Tablet 1 milliGRAM(s) Oral once  dextrose 50% Injectable 25 Gram(s) IV Push once  dextrose 50% Injectable 12.5 Gram(s) IV Push once  dextrose 50% Injectable 25 Gram(s) IV Push once  dextrose Oral Gel 15 Gram(s) Oral once PRN  enalapril 5 milliGRAM(s) Oral every 12 hours  glucagon  Injectable 1 milliGRAM(s) IntraMuscular once  influenza   Vaccine 0.5 milliLiter(s) IntraMuscular once  insulin lispro (ADMELOG) corrective regimen sliding scale   SubCutaneous Before meals and at bedtime  pantoprazole    Tablet 40 milliGRAM(s) Oral before breakfast  senna 2 Tablet(s) Oral at bedtime  sodium chloride 0.65% Nasal 1 Spray(s) Both Nostrils three times a day    IVF:  dextrose 5%. 1000 milliLiter(s) IV Continuous <Continuous>  dextrose 5%. 1000 milliLiter(s) IV Continuous <Continuous>  sodium chloride 0.9%. 1000 milliLiter(s) IV Continuous <Continuous>    CULTURES:    RADIOLOGY & ADDITIONAL TESTS:      ASSESSMENT:  63y/o M with PMHx HTN, athritis, endorses continued left side blurriness since September 2020. He went to an optometrist August 2022 for which he was sent to the ER for further workup. He was diagnosed with pituitary adenoma. He saw endocrinologist Dr. Jim in [place] 2 months ago and states labs were WNL. Pt is now s/p endoscopic transphenoidal resection of pituitary tumor 10/21/22    PLAN:  Neuro:  -neuro/vitals q1h  -DI watch  -skull base precautions  -nasal splints b/l per ENT  -postop MRI sella    Cardiac:   -SBP goal 100-140    PulM:  -room air  -No NC    Renal:  -IVF until adeqaute oral intake  -strict I's and O's  -fowler in place    GI:  -ADAT  -bowel regimen  -PPI while on steroids    Endo:  -ISS  -endocrinology consulted, f/u recs    -ID:  -postop ancef while nasal splints in place per ENT    DispoL  ICU status, full code, pending PT/OT    D/w Dr. D'Amico and Dr. Mccarthy      Assessment:  Present when checked    []  GCS  E   V  M     Heart Failure: []Acute, [] acute on chronic , []chronic  Heart Failure:  [] Diastolic (HFpEF), [] Systolic (HFrEF), []Combined (HFpEF and HFrEF), [] RHF, [] Pulm HTN, [] Other    [] SHASHI, [] ATN, [] AIN, [] other  [] CKD1, [] CKD2, [] CKD 3, [] CKD 4, [] CKD 5, []ESRD    Encephalopathy: [] Metabolic, [] Hepatic, [] toxic, [] Neurological, [] Other    Abnormal Nurtitional Status: [] malnurtition (see nutrition note), [ ]underweight: BMI < 19, [] morbid obesity: BMI >40, [] Cachexia    [] Sepsis  [] hypovolemic shock,[] cardiogenic shock, [] hemorrhagic shock, [] neuogenic shock  [] Acute Respiratory Failure  []Cerebral edema, [] Brain compression/ herniation,   [] Functional quadriplegia  [] Acute blood loss anemia

## 2022-10-22 NOTE — PHYSICAL THERAPY INITIAL EVALUATION ADULT - ADDITIONAL COMMENTS
Patient is an active community ambulator who lives with his wife and cat in a private house with 3 FOS inside. Patient states he is independent with all ADLs prior and denies use of AD when ambulating.

## 2022-10-22 NOTE — PROGRESS NOTE ADULT - SUBJECTIVE AND OBJECTIVE BOX
ENT [facility] DAILY PROGRESS NOTE    Overnight events/Interval HPI:  Patient is a 62M POD1 s/p TSPR for nonsecretory pituitary macroadenoma. Patient had baseline bitemporal hemianopsia and intermittent left eye diplopia prior to surgery. He denies dizziness, headaches, abnormal growth of hands/feet, abnormal growth of facial features, significant unexplained weight loss/gain, abnormal hair growth/loss, or galactorrhea.   Interval 10/22: No acute events overnight. Afebrile, vitals stable. Seen and examined this morning during AM rounds. No epistaxis, no post-nasal drip, no salty/metallic tastes.    Allergies    No Known Allergies    Intolerances        MEDICATIONS:  Antiinfectives:   ceFAZolin   IVPB 2000 milliGRAM(s) IV Intermittent every 8 hours    IV fluids:  dextrose 5%. 1000 milliLiter(s) IV Continuous <Continuous>  dextrose 5%. 1000 milliLiter(s) IV Continuous <Continuous>    Hematologic/Anticoagulation:    Pain medications/Neuro:  acetaminophen     Tablet .. 650 milliGRAM(s) Oral every 6 hours PRN  ondansetron Injectable 4 milliGRAM(s) IV Push every 6 hours PRN  oxyCODONE    IR 5 milliGRAM(s) Oral every 4 hours PRN  oxyCODONE    IR 10 milliGRAM(s) Oral every 4 hours PRN    Endocrine Medications:   dextrose 50% Injectable 25 Gram(s) IV Push once  dextrose 50% Injectable 12.5 Gram(s) IV Push once  dextrose 50% Injectable 25 Gram(s) IV Push once  dextrose Oral Gel 15 Gram(s) Oral once PRN  glucagon  Injectable 1 milliGRAM(s) IntraMuscular once  insulin lispro (ADMELOG) corrective regimen sliding scale   SubCutaneous Before meals and at bedtime    All other standing medications:   influenza   Vaccine 0.5 milliLiter(s) IntraMuscular once  senna 2 Tablet(s) Oral at bedtime  sodium chloride 0.65% Nasal 1 Spray(s) Both Nostrils three times a day    All other PRN medications:  artificial tears (preservative free) Ophthalmic Solution 1 Drop(s) Both EYES every 4 hours PRN  bisacodyl 5 milliGRAM(s) Oral daily PRN      Vital Signs Last 24 Hrs  T(C): 37.3 (22 Oct 2022 06:00), Max: 37.3 (22 Oct 2022 06:00)  T(F): 99.2 (22 Oct 2022 06:00), Max: 99.2 (22 Oct 2022 06:00)  HR: 70 (22 Oct 2022 10:00) (60 - 94)  BP: 99/56 (22 Oct 2022 10:00) (85/53 - 160/84)  BP(mean): 71 (22 Oct 2022 10:00) (63 - 115)  RR: 17 (22 Oct 2022 10:00) (11 - 20)  SpO2: 98% (22 Oct 2022 10:00) (97% - 100%)    Parameters below as of 22 Oct 2022 10:00  Patient On (Oxygen Delivery Method): room air          10-21 @ 07:01  -  10-22 @ 07:00  --------------------------------------------------------  IN:    IV PiggyBack: 200 mL    sodium chloride 0.9%: 1425 mL  Total IN: 1625 mL    OUT:    Indwelling Catheter - Urethral (mL): 2575 mL  Total OUT: 2575 mL    Total NET: -950 mL      10-22 @ 07:01  -  10-22 @ 11:13  --------------------------------------------------------  IN:    multiple electrolytes Injection Type 1 Bolus: 1000 mL    Oral Fluid: 360 mL  Total IN: 1360 mL    OUT:    Indwelling Catheter - Urethral (mL): 585 mL    sodium chloride 0.9%: 0 mL  Total OUT: 585 mL    Total NET: 775 mL            PHYSICAL EXAM:    ENT EXAM-   Constitutional: No acute distress    Head:  normocephalic, atraumatic.   Ears:  Ear canals both clear.    Nose:  Septum intact, midline. Laurent splints in place bilaterally.  OC/OP:  No blood visualized in the posterior oropharynx  Neck:  Trachea midline.  Thyroid, parotid and submandibular glands normal.  Lymph:  No cervical adenopathy.

## 2022-10-22 NOTE — CHART NOTE - NSCHARTNOTEFT_GEN_A_CORE
10/22: POD1, NOMAN overnight, NA and UOP stable. Enalapril held due to low BP this am. Given 1L bolus of plasmalyte for euvolemia. Keep fowler for now, strict Is and Os. Repeat BMP this afternoon. Pending post-op MRI sella. [place] removed, cont fowler for strict is and os on SDU. BMP and specific gravity pend 8pm. Worked well with PT/OT today, home no needs.,

## 2022-10-22 NOTE — PHYSICAL THERAPY INITIAL EVALUATION ADULT - GENERAL OBSERVATIONS, REHAB EVAL
PT IE completed. Patient received semi supine in bed +tele, +RA, +IV, +R raidal A-line, +fowler, +(B) SCDs, NAD, willing to work with PT

## 2022-10-22 NOTE — PHYSICAL THERAPY INITIAL EVALUATION ADULT - PERTINENT HX OF CURRENT PROBLEM, REHAB EVAL
This is a case of an elective transphenoidal approach for resection of pituitary gland neoplasm in a 63 yo M with history of HTN, hernia repair and R. shoulder surgery.

## 2022-10-22 NOTE — PROGRESS NOTE ADULT - SUBJECTIVE AND OBJECTIVE BOX
***********************************************  ADULT NSICU PROGRESS NOTE  YAZ WILD [id number] [facility] [id number]  ***********************************************    24H INTERVAL EVENTS: s/p transphenoidal approach for resection of pituitary gland neoplasm (Drs. D'Amico, Valencia).    ROS: negative except per mentioned above in 24h interval events.      VITALS:    ICU Vital Signs Last 24 Hrs  T(C): 36.4 (21 Oct 2022 14:19), Max: 36.4 (21 Oct 2022 14:19)  T(F): 97.6 (21 Oct 2022 14:19), Max: 97.6 (21 Oct 2022 14:19)  HR: 61 (21 Oct 2022 15:00) (61 - 94)  BP: 120/66 (21 Oct 2022 15:00) (120/66 - 160/84)  BP(mean): 86 (21 Oct 2022 15:00) (86 - 115)  ABP: 131/56 (21 Oct 2022 15:00) (131/56 - 161/76)  ABP(mean): 80 (21 Oct 2022 15:00) (80 - 110)  RR: 13 (21 Oct 2022 15:00) (11 - 20)  SpO2: 99% (21 Oct 2022 15:00) (97% - 100%)    O2 Parameters below as of 21 Oct 2022 15:00  Patient On (Oxygen Delivery Method): face tent  O2 Flow (L/min): 10  O2 Concentration (%): 35            I&O's Summary    21 Oct 2022 07:01  -  21 Oct 2022 16:00  --------------------------------------------------------  IN: 325 mL / OUT: 240 mL / NET: 85 mL        EXAM:     [place] Coma Scale: 3/5/6    General: laying in bed fast asleep, nasal splints  Neuro     MS: Somnolent, full mental status exam deferred, answers questions appropriately and able to follow commands     CN: PERRL, VF FTC, gaze conjugate, EOMI, no ptosis, face symmetric, hearing grossly intact    Mot: bulk normal, power 5/5 throughout  Chest: nonlabored respirations, no adventitious lung sounds bilaterally, heart regular rate/rhythm, present S1/S2, no murmurs or rubs  Abdomen: nondistended, soft and nontender without peritoneal signs, normoactive bowel sounds  Extremities: no clubbing, well-perfused, no edema    LABORATORY DATA:                            11.4   8.03  )-----------( 254      ( 21 Oct 2022 12:30 )             34.2     10-21    138  |  106  |  21  ----------------------------<  127<H>  4.7   |  20<L>  |  1.15    Ca    8.2<L>      21 Oct 2022 12:30  Phos  3.0     10-21  Mg     1.6     10-21          MEDICATIONS  (STANDING):  ceFAZolin   IVPB 2000 milliGRAM(s) IV Intermittent every 8 hours  influenza   Vaccine 0.5 milliLiter(s) IntraMuscular once  pantoprazole    Tablet 40 milliGRAM(s) Oral before breakfast  senna 2 Tablet(s) Oral at bedtime  sodium chloride 0.65% Nasal 1 Spray(s) Both Nostrils two times a day  sodium chloride 0.9%. 1000 milliLiter(s) (75 mL/Hr) IV Continuous <Continuous>    MEDICATIONS  (PRN):  acetaminophen     Tablet .. 650 milliGRAM(s) Oral every 6 hours PRN Temp greater or equal to 38C (100.4F), Mild Pain (1 - 3)  bisacodyl 5 milliGRAM(s) Oral daily PRN Constipation  ondansetron Injectable 4 milliGRAM(s) IV Push every 6 hours PRN Nausea and/or Vomiting  oxyCODONE    IR 5 milliGRAM(s) Oral every 4 hours PRN Moderate Pain (4 - 6)  oxyCODONE    IR 10 milliGRAM(s) Oral every 4 hours PRN Severe Pain (7 - 10)      ***********************************************  ASSESSMENT AND PLAN  ***********************************************    This is a case of an elective transphenoidal approach for resection of pituitary gland neoplasm in a 61 yo M with history of HTN, hernia repair and R. shoulder surgery.    NEURO  #S/p transphenoidal approach for resection of pituitary gland neoplasm, POD#0  - Admit NSICU, Q1h neuro checks / Q1h vital signs  - Skull base precautions  - Abx for nasal splints (ancef)  - MRI pituitary  - PT/OT    PULM  - SpO2 goal > 92%, supplemental O2 and pulm toileting as needed    CARDIO  - BP goal: NORMOTENSION    GI  - Diet: ADAT  - Stress ulcer prophylaxis: while on steroids  - Bowel regimen     /RENAL  - Monitor UOP/volume status, BUN/SCr    HEME  - Maintain Hb > 7.0, PLT > 100,000    ID  - Monitor for infectious s/s, fever curve, leukocytosis  - Perioperative ancef    ENDO  - Consult, DI watch  - Cortisol level in 2 days    10-21-22 @ 16:00       ***********************************************  ADULT NSICU PROGRESS NOTE  YAZ WILD [id number] [facility] [id number] [id number]  ***********************************************    24H INTERVAL EVENTS: no overnight events.      HOSPITAL COURSE CARRIED FORWARD: s/p transphenoidal approach for resection of pituitary gland neoplasm (Drs. D'Amico, Valencia).    ROS: negative except per mentioned above in 24h interval events.      VITALS:    ICU Vital Signs Last 24 Hrs  T(C): 36.4 (21 Oct 2022 14:19), Max: 36.4 (21 Oct 2022 14:19)  T(F): 97.6 (21 Oct 2022 14:19), Max: 97.6 (21 Oct 2022 14:19)  HR: 61 (21 Oct 2022 15:00) (61 - 94)  BP: 120/66 (21 Oct 2022 15:00) (120/66 - 160/84)  BP(mean): 86 (21 Oct 2022 15:00) (86 - 115)  ABP: 131/56 (21 Oct 2022 15:00) (131/56 - 161/76)  ABP(mean): 80 (21 Oct 2022 15:00) (80 - 110)  RR: 13 (21 Oct 2022 15:00) (11 - 20)  SpO2: 99% (21 Oct 2022 15:00) (97% - 100%)    O2 Parameters below as of 21 Oct 2022 15:00  Patient On (Oxygen Delivery Method): face tent  O2 Flow (L/min): 10  O2 Concentration (%): 35            I&O's Summary    21 Oct 2022 07:01  -  21 Oct 2022 16:00  --------------------------------------------------------  IN: 325 mL / OUT: 240 mL / NET: 85 mL        EXAM:     [place] Coma Scale: 4/5/6    General: laying up in bed eating breakfast speaking with assistance of Taiwanese video   Neuro     MS: a/o x3, speech fluent, comprehension intact, repetition & naming intact, no neglect     CN: PERRL, VF FTC, gaze conjugate, EOMI, no ptosis, face symmetric, hearing grossly intact    Mot: bulk normal, power 5/5 throughout  Chest: nonlabored respirations, no adventitious lung sounds bilaterally, heart regular rate/rhythm, present S1/S2, no murmurs or rubs  Abdomen: nondistended, soft and nontender without peritoneal signs, normoactive bowel sounds  Extremities: no clubbing, well-perfused, no edema    LABORATORY DATA:                            11.4   8.03  )-----------( 254      ( 21 Oct 2022 12:30 )             34.2     10-21    138  |  106  |  21  ----------------------------<  127<H>  4.7   |  20<L>  |  1.15    Ca    8.2<L>      21 Oct 2022 12:30  Phos  3.0     10-21  Mg     1.6     10-21          MEDICATIONS  (STANDING):  ceFAZolin   IVPB 2000 milliGRAM(s) IV Intermittent every 8 hours  influenza   Vaccine 0.5 milliLiter(s) IntraMuscular once  pantoprazole    Tablet 40 milliGRAM(s) Oral before breakfast  senna 2 Tablet(s) Oral at bedtime  sodium chloride 0.65% Nasal 1 Spray(s) Both Nostrils two times a day  sodium chloride 0.9%. 1000 milliLiter(s) (75 mL/Hr) IV Continuous <Continuous>    MEDICATIONS  (PRN):  acetaminophen     Tablet .. 650 milliGRAM(s) Oral every 6 hours PRN Temp greater or equal to 38C (100.4F), Mild Pain (1 - 3)  bisacodyl 5 milliGRAM(s) Oral daily PRN Constipation  ondansetron Injectable 4 milliGRAM(s) IV Push every 6 hours PRN Nausea and/or Vomiting  oxyCODONE    IR 5 milliGRAM(s) Oral every 4 hours PRN Moderate Pain (4 - 6)  oxyCODONE    IR 10 milliGRAM(s) Oral every 4 hours PRN Severe Pain (7 - 10)      ***********************************************  ASSESSMENT AND PLAN  ***********************************************    This is a case of an elective transphenoidal approach for resection of pituitary gland neoplasm in a 61 yo M with history of HTN, hernia repair and R. shoulder surgery.    NEURO  #S/p transphenoidal approach for resection of pituitary gland neoplasm, POD#1  - Step down  - Skull base precautions  - Abx for nasal splints (ancef)  - MRI pituitary  - PT/OT    PULM  - SpO2 goal > 92%, supplemental O2 and pulm toileting as needed    CARDIO  - BP goal: NORMOTENSION    GI  - Diet: ADAT  - Stress ulcer prophylaxis: while on steroids  - Bowel regimen     /RENAL  - Monitor UOP/volume status, BUN/SCr    HEME  - Maintain Hb > 7.0, PLT > 100,000    ID  - Monitor for infectious s/s, fever curve, leukocytosis  - Perioperative ancef    ENDO  - Consult, DI watch  - Cortisol level in 2 days

## 2022-10-22 NOTE — PROGRESS NOTE ADULT - ASSESSMENT
Assessment/Plan:  62y Male s/p TSPR for nonsecretory pituitary macroadenoma 10/21/22. No complications.  - ancef while Laurent splints are in place  - continue with nasal saline sprays  - continue to monitor UOP  - continue to monitor electrolytes  - imaging per NSGY  - page ENT with questions/concerns

## 2022-10-22 NOTE — PHYSICAL THERAPY INITIAL EVALUATION ADULT - GAIT DISTANCE, PT EVAL
bed>chair; limited ambulation distance 2/2 patient reporting "dizziness" with all functional mobility. VSS/10 feet

## 2022-10-23 LAB
ANION GAP SERPL CALC-SCNC: 8 MMOL/L — SIGNIFICANT CHANGE UP (ref 5–17)
ANION GAP SERPL CALC-SCNC: 8 MMOL/L — SIGNIFICANT CHANGE UP (ref 5–17)
ANION GAP SERPL CALC-SCNC: 9 MMOL/L — SIGNIFICANT CHANGE UP (ref 5–17)
BUN SERPL-MCNC: 15 MG/DL — SIGNIFICANT CHANGE UP (ref 7–23)
BUN SERPL-MCNC: 17 MG/DL — SIGNIFICANT CHANGE UP (ref 7–23)
CALCIUM SERPL-MCNC: 7.8 MG/DL — LOW (ref 8.4–10.5)
CALCIUM SERPL-MCNC: 8.2 MG/DL — LOW (ref 8.4–10.5)
CHLORIDE SERPL-SCNC: 107 MMOL/L — SIGNIFICANT CHANGE UP (ref 96–108)
CHLORIDE SERPL-SCNC: 110 MMOL/L — HIGH (ref 96–108)
CHLORIDE SERPL-SCNC: 112 MMOL/L — HIGH (ref 96–108)
CO2 SERPL-SCNC: 24 MMOL/L — SIGNIFICANT CHANGE UP (ref 22–31)
CO2 SERPL-SCNC: 25 MMOL/L — SIGNIFICANT CHANGE UP (ref 22–31)
CORTIS AM PEAK SERPL-MCNC: 8.2 UG/DL — SIGNIFICANT CHANGE UP (ref 6.02–18.4)
CREAT SERPL-MCNC: 1.19 MG/DL — SIGNIFICANT CHANGE UP (ref 0.5–1.3)
CREAT SERPL-MCNC: 1.36 MG/DL — HIGH (ref 0.5–1.3)
CREAT SERPL-MCNC: 1.47 MG/DL — HIGH (ref 0.5–1.3)
EGFR: 53 ML/MIN/1.73M2 — LOW
EGFR: 58 ML/MIN/1.73M2 — LOW
EGFR: 68 ML/MIN/1.73M2 — SIGNIFICANT CHANGE UP
GLUCOSE SERPL-MCNC: 106 MG/DL — HIGH (ref 70–99)
GLUCOSE SERPL-MCNC: 109 MG/DL — HIGH (ref 70–99)
HCT VFR BLD CALC: 33.1 % — LOW (ref 39–50)
HGB BLD-MCNC: 10.7 G/DL — LOW (ref 13–17)
MAGNESIUM SERPL-MCNC: 2.2 MG/DL — SIGNIFICANT CHANGE UP (ref 1.6–2.6)
MCHC RBC-ENTMCNC: 29.2 PG — SIGNIFICANT CHANGE UP (ref 27–34)
MCHC RBC-ENTMCNC: 32.3 GM/DL — SIGNIFICANT CHANGE UP (ref 32–36)
MCV RBC AUTO: 90.2 FL — SIGNIFICANT CHANGE UP (ref 80–100)
NRBC # BLD: 0 /100 WBCS — SIGNIFICANT CHANGE UP (ref 0–0)
OSMOLALITY SERPL: 296 MOSM/KG — SIGNIFICANT CHANGE UP (ref 280–301)
PHOSPHATE SERPL-MCNC: 2.3 MG/DL — LOW (ref 2.5–4.5)
PLATELET # BLD AUTO: 254 K/UL — SIGNIFICANT CHANGE UP (ref 150–400)
POTASSIUM SERPL-MCNC: 4.1 MMOL/L — SIGNIFICANT CHANGE UP (ref 3.5–5.3)
POTASSIUM SERPL-MCNC: 4.4 MMOL/L — SIGNIFICANT CHANGE UP (ref 3.5–5.3)
POTASSIUM SERPL-SCNC: 4.1 MMOL/L — SIGNIFICANT CHANGE UP (ref 3.5–5.3)
POTASSIUM SERPL-SCNC: 4.4 MMOL/L — SIGNIFICANT CHANGE UP (ref 3.5–5.3)
RBC # BLD: 3.67 M/UL — LOW (ref 4.2–5.8)
RBC # FLD: 13.5 % — SIGNIFICANT CHANGE UP (ref 10.3–14.5)
SODIUM SERPL-SCNC: 140 MMOL/L — SIGNIFICANT CHANGE UP (ref 135–145)
SODIUM SERPL-SCNC: 144 MMOL/L — SIGNIFICANT CHANGE UP (ref 135–145)
WBC # BLD: 7.6 K/UL — SIGNIFICANT CHANGE UP (ref 3.8–10.5)
WBC # FLD AUTO: 7.6 K/UL — SIGNIFICANT CHANGE UP (ref 3.8–10.5)

## 2022-10-23 PROCEDURE — 99233 SBSQ HOSP IP/OBS HIGH 50: CPT

## 2022-10-23 PROCEDURE — 99223 1ST HOSP IP/OBS HIGH 75: CPT

## 2022-10-23 PROCEDURE — 99232 SBSQ HOSP IP/OBS MODERATE 35: CPT

## 2022-10-23 RX ORDER — SODIUM CHLORIDE 9 MG/ML
1000 INJECTION INTRAMUSCULAR; INTRAVENOUS; SUBCUTANEOUS
Refills: 0 | Status: DISCONTINUED | OUTPATIENT
Start: 2022-10-23 | End: 2022-10-23

## 2022-10-23 RX ORDER — DESMOPRESSIN ACETATE 0.1 MG/1
1 TABLET ORAL ONCE
Refills: 0 | Status: COMPLETED | OUTPATIENT
Start: 2022-10-23 | End: 2022-10-23

## 2022-10-23 RX ORDER — SODIUM CHLORIDE 9 MG/ML
1000 INJECTION INTRAMUSCULAR; INTRAVENOUS; SUBCUTANEOUS ONCE
Refills: 0 | Status: COMPLETED | OUTPATIENT
Start: 2022-10-23 | End: 2022-10-23

## 2022-10-23 RX ADMIN — Medication 100 MILLIGRAM(S): at 14:45

## 2022-10-23 RX ADMIN — Medication 650 MILLIGRAM(S): at 02:15

## 2022-10-23 RX ADMIN — Medication 1 SPRAY(S): at 22:43

## 2022-10-23 RX ADMIN — Medication 1 SPRAY(S): at 14:45

## 2022-10-23 RX ADMIN — Medication 650 MILLIGRAM(S): at 10:56

## 2022-10-23 RX ADMIN — Medication 1 SPRAY(S): at 05:54

## 2022-10-23 RX ADMIN — Medication 85 MILLIMOLE(S): at 10:54

## 2022-10-23 RX ADMIN — Medication 650 MILLIGRAM(S): at 10:54

## 2022-10-23 RX ADMIN — SENNA PLUS 2 TABLET(S): 8.6 TABLET ORAL at 22:38

## 2022-10-23 RX ADMIN — Medication 100 MILLIGRAM(S): at 05:54

## 2022-10-23 RX ADMIN — OXYCODONE HYDROCHLORIDE 10 MILLIGRAM(S): 5 TABLET ORAL at 20:52

## 2022-10-23 RX ADMIN — SENNA PLUS 2 TABLET(S): 8.6 TABLET ORAL at 05:54

## 2022-10-23 RX ADMIN — ENOXAPARIN SODIUM 40 MILLIGRAM(S): 100 INJECTION SUBCUTANEOUS at 22:38

## 2022-10-23 RX ADMIN — SODIUM CHLORIDE 1000 MILLILITER(S): 9 INJECTION INTRAMUSCULAR; INTRAVENOUS; SUBCUTANEOUS at 00:47

## 2022-10-23 RX ADMIN — Medication 650 MILLIGRAM(S): at 18:45

## 2022-10-23 RX ADMIN — Medication 650 MILLIGRAM(S): at 01:20

## 2022-10-23 RX ADMIN — SODIUM CHLORIDE 60 MILLILITER(S): 9 INJECTION INTRAMUSCULAR; INTRAVENOUS; SUBCUTANEOUS at 10:54

## 2022-10-23 RX ADMIN — DESMOPRESSIN ACETATE 1 MICROGRAM(S): 0.1 TABLET ORAL at 00:47

## 2022-10-23 RX ADMIN — Medication 100 MILLIGRAM(S): at 22:38

## 2022-10-23 RX ADMIN — OXYCODONE HYDROCHLORIDE 10 MILLIGRAM(S): 5 TABLET ORAL at 21:50

## 2022-10-23 NOTE — PROGRESS NOTE ADULT - SUBJECTIVE AND OBJECTIVE BOX
ENT [facility] DAILY PROGRESS NOTE    Overnight events/Interval HPI:  Patient is a 62M POD1 s/p TSPR for nonsecretory pituitary macroadenoma. Patient had baseline bitemporal hemianopsia and intermittent left eye diplopia prior to surgery. He denies dizziness, headaches, abnormal growth of hands/feet, abnormal growth of facial features, significant unexplained weight loss/gain, abnormal hair growth/loss, or galactorrhea.   Interval 10/22: No acute events overnight. Afebrile, vitals stable. Seen and examined this morning during AM rounds. No epistaxis, no post-nasal drip, no salty/metallic tastes.  Interval 10/23: Increased UOP overnight. Received 1 mcg DDAVP. Complained of dizziness overnight, now improved. Urine spec grav: < 1.005, Urine osmolality: 345 mosm/kg, Urine Na: <20 mmol/L. No epistaxis, no PND, no salty/metallic tastes. MRI done overnight, pending final radiologist read.     Allergies    No Known Allergies    Intolerances        MEDICATIONS:  Antiinfectives:   ceFAZolin   IVPB 2000 milliGRAM(s) IV Intermittent every 8 hours    IV fluids:  dextrose 5%. 1000 milliLiter(s) IV Continuous <Continuous>  dextrose 5%. 1000 milliLiter(s) IV Continuous <Continuous>  sodium chloride 0.9%. 1000 milliLiter(s) IV Continuous <Continuous>  sodium phosphate IVPB 30 milliMole(s) IV Intermittent once    Hematologic/Anticoagulation:  enoxaparin Injectable 40 milliGRAM(s) SubCutaneous every 24 hours    Pain medications/Neuro:  acetaminophen     Tablet .. 650 milliGRAM(s) Oral every 6 hours PRN  ondansetron Injectable 4 milliGRAM(s) IV Push every 6 hours PRN  oxyCODONE    IR 5 milliGRAM(s) Oral every 4 hours PRN  oxyCODONE    IR 10 milliGRAM(s) Oral every 4 hours PRN    Endocrine Medications:   dextrose 50% Injectable 25 Gram(s) IV Push once  dextrose 50% Injectable 12.5 Gram(s) IV Push once  dextrose 50% Injectable 25 Gram(s) IV Push once  dextrose Oral Gel 15 Gram(s) Oral once PRN  glucagon  Injectable 1 milliGRAM(s) IntraMuscular once    All other standing medications:   influenza   Vaccine 0.5 milliLiter(s) IntraMuscular once  polyethylene glycol 3350 17 Gram(s) Oral daily  senna 2 Tablet(s) Oral at bedtime  sodium chloride 0.65% Nasal 1 Spray(s) Both Nostrils three times a day    All other PRN medications:  artificial tears (preservative free) Ophthalmic Solution 1 Drop(s) Both EYES every 4 hours PRN  bisacodyl 5 milliGRAM(s) Oral daily PRN      Vital Signs Last 24 Hrs  T(C): 36.9 (23 Oct 2022 05:47), Max: 37.9 (22 Oct 2022 21:49)  T(F): 98.4 (23 Oct 2022 05:47), Max: 100.3 (22 Oct 2022 21:49)  HR: 94 (23 Oct 2022 05:47) (68 - 104)  BP: 120/77 (23 Oct 2022 05:47) (96/51 - 126/68)  BP(mean): 83 (22 Oct 2022 17:45) (70 - 83)  RR: 17 (23 Oct 2022 05:47) (10 - 21)  SpO2: 98% (23 Oct 2022 05:47) (94% - 98%)    Parameters below as of 23 Oct 2022 05:47  Patient On (Oxygen Delivery Method): room air          10-22 @ 07:01  -  10-23 @ 07:00  --------------------------------------------------------  IN:    IV PiggyBack: 50 mL    multiple electrolytes Injection Type 1 Bolus: 1000 mL    Oral Fluid: 2120 mL  Total IN: 3170 mL    OUT:    Indwelling Catheter - Urethral (mL): 5435 mL    sodium chloride 0.9%: 0 mL  Total OUT: 5435 mL    Total NET: -2265 mL      10-23 @ 07:01  -  10-23 @ 10:50  --------------------------------------------------------  IN:  Total IN: 0 mL    OUT:    Indwelling Catheter - Urethral (mL): 500 mL  Total OUT: 500 mL    Total NET: -500 mL            PHYSICAL EXAM:    ENT EXAM-   Constitutional: Well-developed, well-nourished.  No hoarseness.     Head:  normocephalic, atraumatic.   Ears:  Ear canals both clear.  Tympanic membranes both intact; no effusion or retraction.  Nose:  Septum intact, midline, deviated.  Inferior turbinates normal bilateral  OC/OP:  Tonsils present/absent. Floor of mouth, buccal mucosa, lips, hard palate, soft palate, uvula, posterior pharyngeal wall normal.  Mucosa moist.  Neck:  Trachea midline.  Thyroid, parotid and submandibular glands normal.  Lymph:  No cervical adenopathy.  Facial Plastics:     MULTISYSTEM EXAM-  Neuro/Psych:  A&O x 3.  Mood stable.  Affect bright.  Cranial nerves: 2-12 grossly intact bilaterally.  Eyes:  EOMI, no nystagmus.  Pulm:  No dyspnea, non-labored breathing  Cardiovascular: Carotid pulses 2+ bilaterally.  No periphreal edema.  Skin:  No rash or lesions on exposed skin of head/neck    LABS:  CBC-      Coagulation Studies-    Endocrine Panel-  Calcium, Total Serum: 8.2 mg/dL (10-23 @ 05:30)  Calcium, Total Serum: 8.8 mg/dL (10-22 @ 23:30)  Calcium, Total Serum: 8.5 mg/dL (10-22 @ 23:17)  Calcium, Total Serum: 7.9 mg/dL (10-22 @ 13:30)              RADIOLOGY & ADDITIONAL STUDIES          Assessment/Plan:  62y Male    PPX: SCDs, DVT ppx with SUBQ hep.    Dispo planning:   -Home care is/is not needed  Page ENT at [phone] with any questions/concerns.   ENT [facility] DAILY PROGRESS NOTE    Overnight events/Interval HPI:  Patient is a 62M POD1 s/p TSPR for nonsecretory pituitary macroadenoma. Patient had baseline bitemporal hemianopsia and intermittent left eye diplopia prior to surgery. He denies dizziness, headaches, abnormal growth of hands/feet, abnormal growth of facial features, significant unexplained weight loss/gain, abnormal hair growth/loss, or galactorrhea.   Interval 10/22: No acute events overnight. Afebrile, vitals stable. Seen and examined this morning during AM rounds. No epistaxis, no post-nasal drip, no salty/metallic tastes.  Interval 10/23: Increased UOP overnight. Received 1 mcg DDAVP. Complained of dizziness overnight, now improved. Urine spec grav: < 1.005, Urine osmolality: 345 mosm/kg, Urine Na: <20 mmol/L. No epistaxis, no PND, no salty/metallic tastes. MRI done overnight, pending final radiologist read.     Allergies    No Known Allergies    Intolerances        MEDICATIONS:  Antiinfectives:   ceFAZolin   IVPB 2000 milliGRAM(s) IV Intermittent every 8 hours    IV fluids:  dextrose 5%. 1000 milliLiter(s) IV Continuous <Continuous>  dextrose 5%. 1000 milliLiter(s) IV Continuous <Continuous>  sodium chloride 0.9%. 1000 milliLiter(s) IV Continuous <Continuous>  sodium phosphate IVPB 30 milliMole(s) IV Intermittent once    Hematologic/Anticoagulation:  enoxaparin Injectable 40 milliGRAM(s) SubCutaneous every 24 hours    Pain medications/Neuro:  acetaminophen     Tablet .. 650 milliGRAM(s) Oral every 6 hours PRN  ondansetron Injectable 4 milliGRAM(s) IV Push every 6 hours PRN  oxyCODONE    IR 5 milliGRAM(s) Oral every 4 hours PRN  oxyCODONE    IR 10 milliGRAM(s) Oral every 4 hours PRN    Endocrine Medications:   dextrose 50% Injectable 25 Gram(s) IV Push once  dextrose 50% Injectable 12.5 Gram(s) IV Push once  dextrose 50% Injectable 25 Gram(s) IV Push once  dextrose Oral Gel 15 Gram(s) Oral once PRN  glucagon  Injectable 1 milliGRAM(s) IntraMuscular once    All other standing medications:   influenza   Vaccine 0.5 milliLiter(s) IntraMuscular once  polyethylene glycol 3350 17 Gram(s) Oral daily  senna 2 Tablet(s) Oral at bedtime  sodium chloride 0.65% Nasal 1 Spray(s) Both Nostrils three times a day    All other PRN medications:  artificial tears (preservative free) Ophthalmic Solution 1 Drop(s) Both EYES every 4 hours PRN  bisacodyl 5 milliGRAM(s) Oral daily PRN      Vital Signs Last 24 Hrs  T(C): 36.9 (23 Oct 2022 05:47), Max: 37.9 (22 Oct 2022 21:49)  T(F): 98.4 (23 Oct 2022 05:47), Max: 100.3 (22 Oct 2022 21:49)  HR: 94 (23 Oct 2022 05:47) (68 - 104)  BP: 120/77 (23 Oct 2022 05:47) (96/51 - 126/68)  BP(mean): 83 (22 Oct 2022 17:45) (70 - 83)  RR: 17 (23 Oct 2022 05:47) (10 - 21)  SpO2: 98% (23 Oct 2022 05:47) (94% - 98%)    Parameters below as of 23 Oct 2022 05:47  Patient On (Oxygen Delivery Method): room air          10-22 @ 07:01  -  10-23 @ 07:00  --------------------------------------------------------  IN:    IV PiggyBack: 50 mL    multiple electrolytes Injection Type 1 Bolus: 1000 mL    Oral Fluid: 2120 mL  Total IN: 3170 mL    OUT:    Indwelling Catheter - Urethral (mL): 5435 mL    sodium chloride 0.9%: 0 mL  Total OUT: 5435 mL    Total NET: -2265 mL      10-23 @ 07:01  -  10-23 @ 10:50  --------------------------------------------------------  IN:  Total IN: 0 mL    OUT:    Indwelling Catheter - Urethral (mL): 500 mL  Total OUT: 500 mL    Total NET: -500 mL      PHYSICAL EXAM:ENT EXAM-   Constitutional: No acute distress    Head:  normocephalic, atraumatic.   Ears:  Ear canals both clear.    Nose:  Septum intact, midline. Laurent splints in place bilaterally.  OC/OP:  No blood visualized in the posterior oropharynx  Neck:  Trachea midline.  Thyroid, parotid and submandibular glands normal.  Lymph:  No cervical adenopathy.    Coagulation Studies-    Endocrine Panel-  Calcium, Total Serum: 8.2 mg/dL (10-23 @ 05:30)  Calcium, Total Serum: 8.8 mg/dL (10-22 @ 23:30)  Calcium, Total Serum: 8.5 mg/dL (10-22 @ 23:17)  Calcium, Total Serum: 7.9 mg/dL (10-22 @ 13:30)

## 2022-10-23 NOTE — OCCUPATIONAL THERAPY INITIAL EVALUATION ADULT - PERTINENT HX OF CURRENT PROBLEM, REHAB EVAL
Pt is a 63 yo M with history of HTN, hernia repair and R shoulder surgery, who presents for an elective transphenoidal approach for resection of pituitary gland neoplasm.

## 2022-10-23 NOTE — OCCUPATIONAL THERAPY INITIAL EVALUATION ADULT - GENERAL OBSERVATIONS, REHAB EVAL
OT IE completed. Pt admitted to [facility] for resection of pituitary gland neoplasm. Orders received, chart reviewed, pt cleared for OT by ALEJANDRA Guo. Pt received semi supine in bed, NAD, +BUE IV, +fowler, +tele. Pt A&Ox4, agreeable to OT, and tolerated session well.

## 2022-10-23 NOTE — CONSULT NOTE ADULT - ASSESSMENT
Pt is a 62y M w/ PMH HTN, HLD presenting for planned endoscopic endonasal transphenoidal resection of pituitary tumor.     Plan    #Pituitary tumor  -as per primary team    #HTN  -normotensive  -on enalapril outpatient; can hold in the setting of SHASHI    #Constipation  -c/w bowel regimen    #SHASHI  -Cr increase from 1.13 (10/21) > 1.47  -hold ACE/ARBs, IV contrast, nephrotoxins  -c/w trending QD    #HLD  -c/w lipitor 10 qd  -restart home ASA when appropriate as per primary team

## 2022-10-23 NOTE — PROGRESS NOTE ADULT - SUBJECTIVE AND OBJECTIVE BOX
HPI:  Presented for evaluation 8/17/22 and endorsed he went to optometrist for left vision changes Sept 2020 for which he was sent to ER for further workup. He was dx with pituitary adenoma. He endorses difficulty seeing neurosurgeon s/t insurance issues. His most recent MRI done 5/21/22 with report unchanged.      He endorses continued left eye left side blurriness. He denies tunnel vision. Notes intermittent left eye double vision.      He denies dizziness, headaches, abnormal growth of hands/feet, abnormal growth of facial features, significant unexplained weight loss/gain, abnormal hair growth/loss, or galactorrhea.     He saw endocrinologist Dr. Jim in [place] 2 months ago and states labs were WNL, he does not have report with him for today's visit.      Patient referred to see neuro-opth Brandon Black and plan made for him to repeat MRI pituitary, CT max/face.      Patient returned 9/23/22 and MRI Sella done 9/11/22 reviewed. Plan made for surgical intervention 10/21/22.     Patient again referred to see neuro- opth Brandon Black and plan made for pre-op CT max/face and see ENT Dr. Birmingham.     TODAY patient present for planned endoscopic endonasal transphenoidal resection of pituitary tumor.     PCP: Dr. Isela Powell      Endo: Dr. Mey Jim  (21 Oct 2022 07:15)      Subjective:  overnight increased urine output, patient drinking to thirst, sodium uptrending , urine specific gravity <1.005, and Cr uptrending. 1mcg DDAVP IV and 1L NS bolus given.     Hospital course:  10/21: POD0 TSS pituitary tumor resection. DI watch. endo consulted  10/22: POD1, NOMAN overnight, NA and UOP stable. Enalapril held due to low BP this am. Given 1L bolus of plasmalyte for euvolemia. Keep fowler for now, strict Is and Os. Repeat BMP this afternoon. Pending post-op MRI sella. [place] removed, cont fowler for strict is and os on SDU. BMP and specific gravity pend 8pm. Worked well with PT/OT today, home no needs. MRI completed.  10/23: POD2, overnight increased urine output, patient drinking to thirst, sodium uptrending , urine specific gravity <1.005, and Cr uptrending. 1mcg DDAVP IV and 1L NS bolus given.     Vital Signs Last 24 Hrs  T(C): 36.7 (23 Oct 2022 00:56), Max: 37.9 (22 Oct 2022 21:49)  T(F): 98.1 (23 Oct 2022 00:56), Max: 100.3 (22 Oct 2022 21:49)  HR: 104 (23 Oct 2022 00:56) (60 - 104)  BP: 126/68 (23 Oct 2022 00:56) (85/53 - 126/68)  BP(mean): 83 (22 Oct 2022 17:45) (63 - 86)  RR: 19 (23 Oct 2022 00:56) (10 - 21)  SpO2: 95% (23 Oct 2022 00:56) (94% - 100%)    Parameters below as of 23 Oct 2022 00:56  Patient On (Oxygen Delivery Method): room air        I&O's Summary    21 Oct 2022 07:01  -  22 Oct 2022 07:00  --------------------------------------------------------  IN: 1625 mL / OUT: 2575 mL / NET: -950 mL    22 Oct 2022 07:01  -  23 Oct 2022 01:20  --------------------------------------------------------  IN: 2330 mL / OUT: 4875 mL / NET: -2545 mL        PHYSICAL EXAM:  General: patient seen laying supine in bed in NAD  Neuro: AAOx3, follows commands, opens eyes spontaneously, speech clear and fluent, CNII-XI grossly intact, face symmetric, no pronator drift, strength 5/5 b/l upper extremities and lower extremities, sensation intact to light touch throughout  HEENT: PERRL, EOMI  Neck: supple  Cardiac: RRR, S1S2  Pulmonary: chest rise symmetric  Abdomen: soft, nontender, nondistended  Ext: perfusing well  Skin: warm, dry          TUBES/LINES:  [x] Fowler  [] Lumbar Drain  [] Wound Drains  [] Others      DIET:  [] NPO  [x] Mechanical  [] Tube feeds    LABS:                        11.0   10.37 )-----------( 252      ( 22 Oct 2022 05:30 )             32.4     10-22    145  |  110<H>  |  19  ----------------------------<  111<H>  4.7   |  26  |  1.36<H>    Ca    8.8      22 Oct 2022 23:30  Phos  2.8     10-22  Mg     2.1     10-22        Urinalysis Basic - ( 22 Oct 2022 22:00 )    Color: x / Appearance: x / SG: <=1.005 / pH: x  Gluc: x / Ketone: x  / Bili: x / Urobili: x   Blood: x / Protein: x / Nitrite: x   Leuk Esterase: x / RBC: x / WBC x   Sq Epi: x / Non Sq Epi: x / Bacteria: x          CAPILLARY BLOOD GLUCOSE      POCT Blood Glucose.: 110 mg/dL (22 Oct 2022 16:49)  POCT Blood Glucose.: 120 mg/dL (22 Oct 2022 11:43)  POCT Blood Glucose.: 138 mg/dL (22 Oct 2022 06:20)      Drug Levels: [] N/A    CSF Analysis: [] N/A      Allergies    No Known Allergies    Intolerances      MEDICATIONS:  Antibiotics:  ceFAZolin   IVPB 2000 milliGRAM(s) IV Intermittent every 8 hours    Neuro:  acetaminophen     Tablet .. 650 milliGRAM(s) Oral every 6 hours PRN  ondansetron Injectable 4 milliGRAM(s) IV Push every 6 hours PRN  oxyCODONE    IR 5 milliGRAM(s) Oral every 4 hours PRN  oxyCODONE    IR 10 milliGRAM(s) Oral every 4 hours PRN    Anticoagulation:  enoxaparin Injectable 40 milliGRAM(s) SubCutaneous every 24 hours    OTHER:  artificial tears (preservative free) Ophthalmic Solution 1 Drop(s) Both EYES every 4 hours PRN  bisacodyl 5 milliGRAM(s) Oral daily PRN  dextrose 50% Injectable 25 Gram(s) IV Push once  dextrose 50% Injectable 12.5 Gram(s) IV Push once  dextrose 50% Injectable 25 Gram(s) IV Push once  dextrose Oral Gel 15 Gram(s) Oral once PRN  glucagon  Injectable 1 milliGRAM(s) IntraMuscular once  influenza   Vaccine 0.5 milliLiter(s) IntraMuscular once  polyethylene glycol 3350 17 Gram(s) Oral daily  senna 2 Tablet(s) Oral at bedtime  sodium chloride 0.65% Nasal 1 Spray(s) Both Nostrils three times a day    IVF:  dextrose 5%. 1000 milliLiter(s) IV Continuous <Continuous>  dextrose 5%. 1000 milliLiter(s) IV Continuous <Continuous>    CULTURES:    RADIOLOGY & ADDITIONAL TESTS:    D35.2    Handoff    MEWS Score    Arthritis    Seasonal allergies    HTN (hypertension)    Pituitary tumor    Sellar or suprasellar mass    Total surgical removal of neoplasm of pituitary gland by endoscopic endonasal transsphenoidal approach    No significant past surgical history    H/O hernia repair    H/O shoulder surgery    Room Service Assist    SysAdmin_VstLnk        ASSESSMENT:  63y/o M with PMHx HTN, athritis, endorses continued left side blurriness since September 2020. He went to an optometrist August 2022 for which he was sent to the ER for further workup. He was diagnosed with pituitary adenoma. He saw endocrinologist Dr. iJm in [place] 2 months ago and states labs were WNL. Pt is now s/p endoscopic transphenoidal resection of pituitary tumor 10/21/22    PLAN:  Neuro:  -neuro q4hrs/vitals q4hr  -DI watch  -skull base precautions  -nasal splints b/l per ENT  -MRI sella postop completed 10/22    Cardiac:   -SBP goal 100-140  -cont home enalapril     PulM:  -room air  -No NC    Renal:  -IVL   -strict I's and O's  -fowler in place    GI:  -regular diet   -bowel regimen    Endo:  -ISS  -endocrinology consulted, f/u recs    -ID:  -postop ancef while nasal splints in place per ENT    DispoL  SDU status, full code, PT rec no needs    D/w Dr. D'Amico       Assessment:  Present when checked    []  GCS  E   V  M     Heart Failure: []Acute, [] acute on chronic , []chronic  Heart Failure:  [] Diastolic (HFpEF), [] Systolic (HFrEF), []Combined (HFpEF and HFrEF), [] RHF, [] Pulm HTN, [] Other    [] SHASHI, [] ATN, [] AIN, [] other  [] CKD1, [] CKD2, [] CKD 3, [] CKD 4, [] CKD 5, []ESRD    Encephalopathy: [] Metabolic, [] Hepatic, [] toxic, [] Neurological, [] Other    Abnormal Nurtitional Status: [] malnurtition (see nutrition note), [ ]underweight: BMI < 19, [] morbid obesity: BMI >40, [] Cachexia    [] Sepsis  [] hypovolemic shock,[] cardiogenic shock, [] hemorrhagic shock, [] neuogenic shock  [] Acute Respiratory Failure  []Cerebral edema, [] Brain compression/ herniation,   [] Functional quadriplegia  [] Acute blood loss anemia   HPI:  Presented for evaluation 8/17/22 and endorsed he went to optometrist for left vision changes Sept 2020 for which he was sent to ER for further workup. He was dx with pituitary adenoma. He endorses difficulty seeing neurosurgeon s/t insurance issues. His most recent MRI done 5/21/22 with report unchanged.      He endorses continued left eye left side blurriness. He denies tunnel vision. Notes intermittent left eye double vision.      He denies dizziness, headaches, abnormal growth of hands/feet, abnormal growth of facial features, significant unexplained weight loss/gain, abnormal hair growth/loss, or galactorrhea.     He saw endocrinologist Dr. Jim in [place] 2 months ago and states labs were WNL, he does not have report with him for today's visit.      Patient referred to see neuro-opth Brandon Black and plan made for him to repeat MRI pituitary, CT max/face.      Patient returned 9/23/22 and MRI Sella done 9/11/22 reviewed. Plan made for surgical intervention 10/21/22.     Patient again referred to see neuro- opth Brandon Black and plan made for pre-op CT max/face and see ENT Dr. Birmingham.     TODAY patient present for planned endoscopic endonasal transphenoidal resection of pituitary tumor.     PCP: Dr. Isela Powell      Endo: Dr. Mey Jim  (21 Oct 2022 07:15)      Subjective:  overnight increased urine output, patient drinking to thirst, sodium uptrending , urine specific gravity <1.005, and Cr uptrending. 1mcg DDAVP IV and 1L NS bolus given.     Dominican : Kirstin ID# [id number]    Hospital course:  10/21: POD0 TSS pituitary tumor resection. DI watch. endo consulted  10/22: POD1, NOMAN overnight, NA and UOP stable. Enalapril held due to low BP this am. Given 1L bolus of plasmalyte for euvolemia. Keep fowler for now, strict Is and Os. Repeat BMP this afternoon. Pending post-op MRI sella. [place] removed, cont fowler for strict is and os on SDU. BMP and specific gravity pend 8pm. Worked well with PT/OT today, home no needs. MRI completed.  10/23: POD2, overnight increased urine output, patient drinking to thirst, sodium uptrending , urine specific gravity <1.005, and Cr uptrending. 1mcg DDAVP IV and 1L NS bolus given.     Vital Signs Last 24 Hrs  T(C): 36.7 (23 Oct 2022 00:56), Max: 37.9 (22 Oct 2022 21:49)  T(F): 98.1 (23 Oct 2022 00:56), Max: 100.3 (22 Oct 2022 21:49)  HR: 104 (23 Oct 2022 00:56) (60 - 104)  BP: 126/68 (23 Oct 2022 00:56) (85/53 - 126/68)  BP(mean): 83 (22 Oct 2022 17:45) (63 - 86)  RR: 19 (23 Oct 2022 00:56) (10 - 21)  SpO2: 95% (23 Oct 2022 00:56) (94% - 100%)    Parameters below as of 23 Oct 2022 00:56  Patient On (Oxygen Delivery Method): room air        I&O's Summary    21 Oct 2022 07:01  -  22 Oct 2022 07:00  --------------------------------------------------------  IN: 1625 mL / OUT: 2575 mL / NET: -950 mL    22 Oct 2022 07:01  -  23 Oct 2022 01:20  --------------------------------------------------------  IN: 2330 mL / OUT: 4875 mL / NET: -2545 mL        PHYSICAL EXAM:  General: patient seen laying supine in bed in NAD  Neuro: AAOx3, follows commands, opens eyes spontaneously, speech clear and fluent, CNII-XI grossly intact, face symmetric, no pronator drift, strength 5/5 b/l upper extremities and lower extremities, sensation intact to light touch throughout  HEENT: PERRL, EOMI  Neck: supple  Cardiac: RRR, S1S2  Pulmonary: chest rise symmetric  Abdomen: soft, nontender, nondistended  Ext: perfusing well  Skin: warm, dry          TUBES/LINES:  [x] Fowler  [] Lumbar Drain  [] Wound Drains  [] Others      DIET:  [] NPO  [x] Mechanical  [] Tube feeds    LABS:                        11.0   10.37 )-----------( 252      ( 22 Oct 2022 05:30 )             32.4     10-22    145  |  110<H>  |  19  ----------------------------<  111<H>  4.7   |  26  |  1.36<H>    Ca    8.8      22 Oct 2022 23:30  Phos  2.8     10-22  Mg     2.1     10-22        Urinalysis Basic - ( 22 Oct 2022 22:00 )    Color: x / Appearance: x / SG: <=1.005 / pH: x  Gluc: x / Ketone: x  / Bili: x / Urobili: x   Blood: x / Protein: x / Nitrite: x   Leuk Esterase: x / RBC: x / WBC x   Sq Epi: x / Non Sq Epi: x / Bacteria: x          CAPILLARY BLOOD GLUCOSE      POCT Blood Glucose.: 110 mg/dL (22 Oct 2022 16:49)  POCT Blood Glucose.: 120 mg/dL (22 Oct 2022 11:43)  POCT Blood Glucose.: 138 mg/dL (22 Oct 2022 06:20)      Drug Levels: [] N/A    CSF Analysis: [] N/A      Allergies    No Known Allergies    Intolerances      MEDICATIONS:  Antibiotics:  ceFAZolin   IVPB 2000 milliGRAM(s) IV Intermittent every 8 hours    Neuro:  acetaminophen     Tablet .. 650 milliGRAM(s) Oral every 6 hours PRN  ondansetron Injectable 4 milliGRAM(s) IV Push every 6 hours PRN  oxyCODONE    IR 5 milliGRAM(s) Oral every 4 hours PRN  oxyCODONE    IR 10 milliGRAM(s) Oral every 4 hours PRN    Anticoagulation:  enoxaparin Injectable 40 milliGRAM(s) SubCutaneous every 24 hours    OTHER:  artificial tears (preservative free) Ophthalmic Solution 1 Drop(s) Both EYES every 4 hours PRN  bisacodyl 5 milliGRAM(s) Oral daily PRN  dextrose 50% Injectable 25 Gram(s) IV Push once  dextrose 50% Injectable 12.5 Gram(s) IV Push once  dextrose 50% Injectable 25 Gram(s) IV Push once  dextrose Oral Gel 15 Gram(s) Oral once PRN  glucagon  Injectable 1 milliGRAM(s) IntraMuscular once  influenza   Vaccine 0.5 milliLiter(s) IntraMuscular once  polyethylene glycol 3350 17 Gram(s) Oral daily  senna 2 Tablet(s) Oral at bedtime  sodium chloride 0.65% Nasal 1 Spray(s) Both Nostrils three times a day    IVF:  dextrose 5%. 1000 milliLiter(s) IV Continuous <Continuous>  dextrose 5%. 1000 milliLiter(s) IV Continuous <Continuous>    CULTURES:    RADIOLOGY & ADDITIONAL TESTS:    D35.2    Handoff    MEWS Score    Arthritis    Seasonal allergies    HTN (hypertension)    Pituitary tumor    Sellar or suprasellar mass    Total surgical removal of neoplasm of pituitary gland by endoscopic endonasal transsphenoidal approach    No significant past surgical history    H/O hernia repair    H/O shoulder surgery    Room Service Assist    SysAdmin_VstLnk        ASSESSMENT:  61y/o M with PMHx HTN, athritis, endorses continued left side blurriness since September 2020. He went to an optometrist August 2022 for which he was sent to the ER for further workup. He was diagnosed with pituitary adenoma. He saw endocrinologist Dr. Jim in [place] 2 months ago and states labs were WNL. Pt is now s/p endoscopic transphenoidal resection of pituitary tumor 10/21/22    PLAN:  Neuro:  -neuro q4hrs/vitals q4hr  -DI watch  -skull base precautions  -nasal splints b/l per ENT  -MRI sella postop completed 10/22    Cardiac:   -SBP goal 100-140  -cont home enalapril     PulM:  -room air  -No NC    Renal:  -IVL   -strict I's and O's  -fowler in place    GI:  -regular diet   -bowel regimen    Endo:  -ISS  -endocrinology consulted, f/u recs    -ID:  -postop ancef while nasal splints in place per ENT    DispoL  SDU status, full code, PT rec no needs    D/w Dr. D'Amico       Assessment:  Present when checked    []  GCS  E   V  M     Heart Failure: []Acute, [] acute on chronic , []chronic  Heart Failure:  [] Diastolic (HFpEF), [] Systolic (HFrEF), []Combined (HFpEF and HFrEF), [] RHF, [] Pulm HTN, [] Other    [] SHASHI, [] ATN, [] AIN, [] other  [] CKD1, [] CKD2, [] CKD 3, [] CKD 4, [] CKD 5, []ESRD    Encephalopathy: [] Metabolic, [] Hepatic, [] toxic, [] Neurological, [] Other    Abnormal Nurtitional Status: [] malnurtition (see nutrition note), [ ]underweight: BMI < 19, [] morbid obesity: BMI >40, [] Cachexia    [] Sepsis  [] hypovolemic shock,[] cardiogenic shock, [] hemorrhagic shock, [] neuogenic shock  [] Acute Respiratory Failure  []Cerebral edema, [] Brain compression/ herniation,   [] Functional quadriplegia  [] Acute blood loss anemia

## 2022-10-23 NOTE — OCCUPATIONAL THERAPY INITIAL EVALUATION ADULT - ADDITIONAL COMMENTS
Pt lives with wife in apartment with 3 FOS to enter. Pt reporting that prior to admission, he performed all ADLs/functional mobility independently and w/o use of AD/AE. Pt reporting baseline double vision in L eye at baseline, however no visual deficits observed during OT IE.

## 2022-10-23 NOTE — PROGRESS NOTE ADULT - SUBJECTIVE AND OBJECTIVE BOX
Weekend PN/Overnight events  Pt seen and examined at bedside. Russian PI Interpretor [id number]. Reports some increased thirst yesterday. Had increased UO on 10/22, improved after 1 mcg DDAVP IV administration. Reports no other complaints. no f/c or pain. appetite control. AM cortisol at 8. hemodynamically stable.    Labs obtained prior to admission (8/19/22):  - TSH: 1.42.  - Free T4: 0.9.  - FSH: 4.6.  - LH: 3.9  - Total Testosterone: 394.  - DHEAS: 45.8.  - Sex hormone binding globulin: 44.9.  - Estradiol: 12.  - Prolactin 0.9.  - Growth hormone: <0.03.   - IGF-1: 98.  - Cortisol serum: 7.3. Free Cortisol 0.5. (8/30/22).     PAST MEDICAL & SURGICAL HISTORY:  As per history of present illness.     SOCIAL HISTORY  - Work:   - Alcohol: Denied  - Smoking: Smoked for 15 years, 1/2 ppd, stopped 30 years ago.   - Recreational Drugs: Denied    ALLERGIES  No Known Allergies    CURRENT MEDICATIONS  acetaminophen     Tablet .. 650 milliGRAM(s) Oral every 6 hours PRN  artificial tears (preservative free) Ophthalmic Solution 1 Drop(s) Both EYES every 4 hours PRN  bisacodyl 5 milliGRAM(s) Oral daily PRN  ceFAZolin   IVPB 2000 milliGRAM(s) IV Intermittent every 8 hours  dextrose 5%. 1000 milliLiter(s) IV Continuous <Continuous>  dextrose 5%. 1000 milliLiter(s) IV Continuous <Continuous>  dextrose 50% Injectable 25 Gram(s) IV Push once  dextrose 50% Injectable 12.5 Gram(s) IV Push once  dextrose 50% Injectable 25 Gram(s) IV Push once  dextrose Oral Gel 15 Gram(s) Oral once PRN  enalapril 5 milliGRAM(s) Oral every 12 hours  glucagon  Injectable 1 milliGRAM(s) IntraMuscular once  influenza   Vaccine 0.5 milliLiter(s) IntraMuscular once  insulin lispro (ADMELOG) corrective regimen sliding scale   SubCutaneous Before meals and at bedtime  ondansetron Injectable 4 milliGRAM(s) IV Push every 6 hours PRN  oxyCODONE    IR 5 milliGRAM(s) Oral every 4 hours PRN  oxyCODONE    IR 10 milliGRAM(s) Oral every 4 hours PRN  pantoprazole    Tablet 40 milliGRAM(s) Oral before breakfast  senna 2 Tablet(s) Oral at bedtime  sodium chloride 0.65% Nasal 1 Spray(s) Both Nostrils three times a day  sodium chloride 0.9%. 1000 milliLiter(s) IV Continuous <Continuous>    REVIEW OF SYSTEMS  Constitutional:  Negative fever, chills or loss of appetite.  Eyes:  Negative blurry vision or double vision.  Cardiovascular:  Negative for chest pain or palpitations.  Respiratory:  Negative for cough, wheezing, or SOB.   Gastrointestinal:  Negative for nausea, vomiting, diarrhea, constipation, or abdominal pain.  Genitourinary:  Negative frequency, urgency or dysuria.  Neurologic:  No headache, confusion, dizziness, lightheadedness.    PHYSICAL EXAM  Vital Signs Last 24 Hrs  T(C): 36.4 (21 Oct 2022 16:28), Max: 36.4 (21 Oct 2022 14:19)  T(F): 97.6 (21 Oct 2022 16:28), Max: 97.6 (21 Oct 2022 14:19)  HR: 69 (21 Oct 2022 20:00) (61 - 94)  BP: 142/70 (21 Oct 2022 20:00) (118/71 - 160/84)  BP(mean): 100 (21 Oct 2022 20:00) (86 - 115)  RR: 15 (21 Oct 2022 20:00) (11 - 20)  SpO2: 100% (21 Oct 2022 20:00) (97% - 100%)    Parameters below as of 21 Oct 2022 20:00  Patient On (Oxygen Delivery Method): face tent  O2 Flow (L/min): 10  O2 Concentration (%): 35  Height (cm): 165.1 (10-21 @ 07:38)  Weight (kg): 61.4 (10-21 @ 07:38)  BMI (kg/m2): 22.5 (10-21 @ 07:38)    Constitutional: Awake, alert, in no acute distress.   HEENT: Normocephalic, atraumatic, LEI, no proptosis or lid retraction.   Neck: supple, no acanthosis, no thyromegaly or palpable thyroid nodules.  Respiratory: Lungs clear to ausculation bilaterally.   Cardiovascular: regular rhythm, normal S1 and S2, no audible murmurs.   GI: soft, non-tender, non-distended, bowel sounds present, no masses appreciated.  Extremities: No lower extremity edema, peripheral pulses present.   Skin: no rashes.   Psychiatric: AAO x 3. Normal affect/mood.     LABS                        11.4   8.03  )-----------( 254      ( 21 Oct 2022 12:30 )             34.2     10-21    139  |  105  |  20  ----------------------------<  153<H>  4.1   |  22  |  1.13    Ca    8.1<L>      21 Oct 2022 16:00  Phos  3.0     10-21  Mg     1.6     10-21    CAPILLARY BLOOD GLUCOSE  POCT Blood Glucose.: 120 mg/dL (21 Oct 2022 16:29)    ASSESSMENT / RECOMMENDATIONS  YAZ WILD is a 62y Male with a past medical history of pituitary macroadenoma, hypertension and arthritis who was admitted for further management of his pituitary adenoma s/p transphenoidal resection (10/21/22). Endocrinology was consulted for recommendations regarding post-operative management of pituitary adenoma.     Weight: 61 kg  BMI: 22  Creatinine: 1.13  GFR: 73    # Pituitary macroadenoma s/p resection  - Strict intake and output. If possible, d/c NS standing IVF and allow pt to hydrate as thirst mechanism is intact.  - Monitor for diabetes insipidus.   - Per primary team, patient received 4 mg of dexamethasone intra-op. He's also planned to receive 1 mg of dexamethasone tomorrow. AM cortisol 10/22 at 8, he remains hemodynamically stable.   - Please also obtain a morning cortisol and a Free T4 level on Monday morning (10/24/22).   - Upon discharge, patient can continue to follow-up with his Endocrinologist.       Service Pager: [phone]

## 2022-10-23 NOTE — PROVIDER CONTACT NOTE (OTHER) - SITUATION
Pt stated he is not feeling well. At start of shift BP was 96/51. Pt complains of dizziness when moving. His urine output in 2 hours was 700 ml.

## 2022-10-23 NOTE — OCCUPATIONAL THERAPY INITIAL EVALUATION ADULT - DIAGNOSIS, OT EVAL
Pt presenting with no neurological impairments and able to perform all ADLs/functional mobility independently at this time.

## 2022-10-23 NOTE — OCCUPATIONAL THERAPY INITIAL EVALUATION ADULT - REHAB POTENTIAL, OT EVAL
Further skilled OT services are not warranted at this time as pt presenting at baseline independence./none

## 2022-10-23 NOTE — OCCUPATIONAL THERAPY INITIAL EVALUATION ADULT - MODALITIES TREATMENT COMMENTS
Pt able to ambulate ~150ft and ascend/descend 4 stairs independently and w/o use of AD. Pt with no significant impairments/deficits observed throughout.

## 2022-10-23 NOTE — CONSULT NOTE ADULT - SUBJECTIVE AND OBJECTIVE BOX
Patient seen and evaluated at bedside    HPI:  Pt is a 62y M w/ PMH HTN presenting for planned endoscopic endonasal transphenoidal resection of pituitary tumor. Patient reporting mild HA after working with PT and constipation for several days; currently denying CP, dyspnea, palpitations, presyncope, syncope, ab pain.    PMHx:   Arthritis    Seasonal allergies    HTN (hypertension)      PSHx:   No significant past surgical history    H/O hernia repair    H/O shoulder surgery      FAMILY HISTORY:    Allergies:  No Known Allergies    Home Medications:  Aspir 81 oral delayed release tablet: 1 tab(s) orally once a day (21 Oct 2022 06:28)  atorvastatin: orally once a day (21 Oct 2022 06:28)  enalapril 5 mg oral tablet: orally 2 times a day (21 Oct 2022 06:28)   mg oral tablet: 1 tab(s) orally every 6 hours, As Needed (21 Oct 2022 06:28)  Tylenol 500 mg oral tablet: 2 tab(s) orally every 6 hours, As Needed (21 Oct 2022 06:28)    Current Medications:   acetaminophen     Tablet .. 650 milliGRAM(s) Oral every 6 hours PRN  artificial tears (preservative free) Ophthalmic Solution 1 Drop(s) Both EYES every 4 hours PRN  bisacodyl 5 milliGRAM(s) Oral daily PRN  ceFAZolin   IVPB 2000 milliGRAM(s) IV Intermittent every 8 hours  dextrose 5%. 1000 milliLiter(s) IV Continuous <Continuous>  dextrose 5%. 1000 milliLiter(s) IV Continuous <Continuous>  dextrose 50% Injectable 25 Gram(s) IV Push once  dextrose 50% Injectable 12.5 Gram(s) IV Push once  dextrose 50% Injectable 25 Gram(s) IV Push once  dextrose Oral Gel 15 Gram(s) Oral once PRN  enoxaparin Injectable 40 milliGRAM(s) SubCutaneous every 24 hours  glucagon  Injectable 1 milliGRAM(s) IntraMuscular once  influenza   Vaccine 0.5 milliLiter(s) IntraMuscular once  ondansetron Injectable 4 milliGRAM(s) IV Push every 6 hours PRN  oxyCODONE    IR 5 milliGRAM(s) Oral every 4 hours PRN  oxyCODONE    IR 10 milliGRAM(s) Oral every 4 hours PRN  polyethylene glycol 3350 17 Gram(s) Oral daily  senna 2 Tablet(s) Oral at bedtime  sodium chloride 0.65% Nasal 1 Spray(s) Both Nostrils three times a day  sodium chloride 0.9%. 1000 milliLiter(s) IV Continuous <Continuous>    Social History  Smoking History: denies  Alcohol Use: denies  Drug Use: denies    REVIEW OF SYSTEMS:    [X] All other systems negative or otherwise described above.  [ ] Unable to assess ROS due to ________.    ICU Vital Signs Last 24 Hrs  T(C): 36.8 (23 Oct 2022 13:34), Max: 37.9 (22 Oct 2022 21:49)  T(F): 98.3 (23 Oct 2022 13:34), Max: 100.3 (22 Oct 2022 21:49)  HR: 91 (23 Oct 2022 13:34) (74 - 104)  BP: 128/74 (23 Oct 2022 13:34) (96/51 - 144/70)  BP(mean): 83 (22 Oct 2022 17:45) (74 - 83)  ABP: 115/53 (22 Oct 2022 16:00) (115/53 - 115/53)  ABP(mean): 73 (22 Oct 2022 16:00) (73 - 73)  RR: 18 (23 Oct 2022 13:34) (13 - 21)  SpO2: 96% (23 Oct 2022 13:34) (94% - 98%)    O2 Parameters below as of 23 Oct 2022 13:34  Patient On (Oxygen Delivery Method): room air          Orthostatic VS    Daily     Daily   I&O's Summary    22 Oct 2022 07:01  -  23 Oct 2022 07:00  --------------------------------------------------------  IN: 3170 mL / OUT: 5435 mL / NET: -2265 mL    23 Oct 2022 07:01  -  23 Oct 2022 15:59  --------------------------------------------------------  IN: 420 mL / OUT: 850 mL / NET: -430 mL        Physical Exam:  GENERAL: No acute distress, well-developed  HEAD:  Atraumatic, Normocephalic  ENT: EOMI, conjunctiva and sclera clear, Neck supple, No JVD, moist mucosa  CHEST/LUNG: Clear to auscultation bilaterally; No wheeze, equal breath sounds bilaterally   BACK: No spinal tenderness  HEART: Regular rate and rhythm; No murmurs, rubs, or gallops, radial and DP 2+ b/l, euvolemic  ABDOMEN: Soft, Nontender, Nondistended  EXTREMITIES:  No clubbing, cyanosis, or edema  PSYCH: Nl behavior, nl affect  NEUROLOGY: AAOx3, non-focal  SKIN: Normal color, No rashes or lesions  LINES: no central lines present    LABS:                        10.7   7.60  )-----------( 254      ( 23 Oct 2022 05:30 )             33.1       10-23    144  |  112<H>  |  17  ----------------------------<  109<H>  4.4   |  24  |  1.47<H>    Ca    8.2<L>      23 Oct 2022 05:30  Phos  2.3     10-23  Mg     2.2     10-23        HgA1c: A1C with Estimated Average Glucose (10.22.22 @ 05:30)    A1C with Estimated Average Glucose Result: 5.3: Reference Range                 4.0-5.6%       High risk (prediabetic)        5.7-6.4%       Diabetic, diagnostic             >=6.5%       ADA diabetic treatment goal       <7.0%  Reference ranges are based upon the 2010 recommendations of the American  Diabetes Association.  Interpretation may vary for children and  adolescents. %    Estimated Average Glucose: 105 mg/dL      Urinalysis Basic - ( 22 Oct 2022 22:00 )    Color: x / Appearance: x / SG: <=1.005 / pH: x  Gluc: x / Ketone: x  / Bili: x / Urobili: x   Blood: x / Protein: x / Nitrite: x   Leuk Esterase: x / RBC: x / WBC x   Sq Epi: x / Non Sq Epi: x / Bacteria: x          RADIOLOGY & ADDITIONAL STUDIES:    CXR: Personally reviewed  < from: Xray Chest 2 Views PA/Lat (10.11.22 @ 10:22) >  Impression:    No radiographic evidence of acute cardiopulmonary disease.    --- End of Report ---    < end of copied text >      Other misc imaging:   < from: MR Sella alone w/wo IV Cont (10.22.22 @ 18:49) >  IMPRESSION:    Status post resection of sellar/suprasellar mass with associated   postsurgical changes as above.Resolution of mass effect on the optic   chiasm. 1.7 cm hypoenhancing soft tissue surrounding the cavernous right   ICA suspicious for residual tumor as detailed above.    --- End of Report ---    < end of copied text >

## 2022-10-23 NOTE — PROGRESS NOTE ADULT - ASSESSMENT
Assessment/Plan:  62y Male s/p TSPR for nonsecretory pituitary macroadenoma 10/21/22. No complications. Now with increased UOP.  - ancef while Laurent splints are in place  - continue with nasal saline sprays  - continue to monitor UOP  - continue to monitor electrolytes  - follow-up MRI  - page ENT with questions/concerns Assessment/Plan:  62y Male s/p TSPR for nonsecretory pituitary macroadenoma 10/21/22. No complications. Now with increased UOP.  - management of increased UOP per primary team  - ancef while Laurent splints are in place  - continue with nasal saline sprays  - continue to monitor UOP  - continue to monitor electrolytes  - follow-up MRI  - page ENT with questions/concerns

## 2022-10-23 NOTE — PROVIDER CONTACT NOTE (OTHER) - ACTION/TREATMENT ORDERED:
Provider notified. No Tylenol to be given at the moment. Will continue to monitor. Urine Gravity collected and sent to lab.

## 2022-10-24 ENCOUNTER — TRANSCRIPTION ENCOUNTER (OUTPATIENT)
Age: 62
End: 2022-10-24

## 2022-10-24 VITALS
SYSTOLIC BLOOD PRESSURE: 134 MMHG | TEMPERATURE: 99 F | OXYGEN SATURATION: 97 % | RESPIRATION RATE: 16 BRPM | HEART RATE: 69 BPM | DIASTOLIC BLOOD PRESSURE: 72 MMHG

## 2022-10-24 DIAGNOSIS — Z98.890 OTHER SPECIFIED POSTPROCEDURAL STATES: ICD-10-CM

## 2022-10-24 PROBLEM — J30.2 OTHER SEASONAL ALLERGIC RHINITIS: Chronic | Status: ACTIVE | Noted: 2022-10-20

## 2022-10-24 PROBLEM — I10 ESSENTIAL (PRIMARY) HYPERTENSION: Chronic | Status: ACTIVE | Noted: 2022-10-20

## 2022-10-24 LAB
ANION GAP SERPL CALC-SCNC: 7 MMOL/L — SIGNIFICANT CHANGE UP (ref 5–17)
BUN SERPL-MCNC: 13 MG/DL — SIGNIFICANT CHANGE UP (ref 7–23)
CALCIUM SERPL-MCNC: 8.8 MG/DL — SIGNIFICANT CHANGE UP (ref 8.4–10.5)
CHLORIDE SERPL-SCNC: 102 MMOL/L — SIGNIFICANT CHANGE UP (ref 96–108)
CO2 SERPL-SCNC: 27 MMOL/L — SIGNIFICANT CHANGE UP (ref 22–31)
CORTIS AM PEAK SERPL-MCNC: 13.47 UG/DL — SIGNIFICANT CHANGE UP (ref 6.02–18.4)
CREAT SERPL-MCNC: 1.22 MG/DL — SIGNIFICANT CHANGE UP (ref 0.5–1.3)
EGFR: 66 ML/MIN/1.73M2 — SIGNIFICANT CHANGE UP
GLUCOSE SERPL-MCNC: 106 MG/DL — HIGH (ref 70–99)
HCT VFR BLD CALC: 34.1 % — LOW (ref 39–50)
HGB BLD-MCNC: 11.1 G/DL — LOW (ref 13–17)
MAGNESIUM SERPL-MCNC: 2.1 MG/DL — SIGNIFICANT CHANGE UP (ref 1.6–2.6)
MCHC RBC-ENTMCNC: 29.1 PG — SIGNIFICANT CHANGE UP (ref 27–34)
MCHC RBC-ENTMCNC: 32.6 GM/DL — SIGNIFICANT CHANGE UP (ref 32–36)
MCV RBC AUTO: 89.5 FL — SIGNIFICANT CHANGE UP (ref 80–100)
NRBC # BLD: 0 /100 WBCS — SIGNIFICANT CHANGE UP (ref 0–0)
PHOSPHATE SERPL-MCNC: 2.9 MG/DL — SIGNIFICANT CHANGE UP (ref 2.5–4.5)
PLATELET # BLD AUTO: 262 K/UL — SIGNIFICANT CHANGE UP (ref 150–400)
POTASSIUM SERPL-MCNC: 4.5 MMOL/L — SIGNIFICANT CHANGE UP (ref 3.5–5.3)
POTASSIUM SERPL-SCNC: 4.5 MMOL/L — SIGNIFICANT CHANGE UP (ref 3.5–5.3)
RBC # BLD: 3.81 M/UL — LOW (ref 4.2–5.8)
RBC # FLD: 13.2 % — SIGNIFICANT CHANGE UP (ref 10.3–14.5)
SODIUM SERPL-SCNC: 136 MMOL/L — SIGNIFICANT CHANGE UP (ref 135–145)
T4 AB SER-ACNC: 6.25 UG/DL — SIGNIFICANT CHANGE UP (ref 4.5–11.7)
WBC # BLD: 7.04 K/UL — SIGNIFICANT CHANGE UP (ref 3.8–10.5)
WBC # FLD AUTO: 7.04 K/UL — SIGNIFICANT CHANGE UP (ref 3.8–10.5)

## 2022-10-24 PROCEDURE — 88360 TUMOR IMMUNOHISTOCHEM/MANUAL: CPT

## 2022-10-24 PROCEDURE — 83930 ASSAY OF BLOOD OSMOLALITY: CPT

## 2022-10-24 PROCEDURE — 83735 ASSAY OF MAGNESIUM: CPT

## 2022-10-24 PROCEDURE — 82533 TOTAL CORTISOL: CPT

## 2022-10-24 PROCEDURE — 88333 PATH CONSLTJ SURG CYTO XM 1: CPT

## 2022-10-24 PROCEDURE — 80048 BASIC METABOLIC PNL TOTAL CA: CPT

## 2022-10-24 PROCEDURE — 86901 BLOOD TYPING SEROLOGIC RH(D): CPT

## 2022-10-24 PROCEDURE — 85027 COMPLETE CBC AUTOMATED: CPT

## 2022-10-24 PROCEDURE — 88341 IMHCHEM/IMCYTCHM EA ADD ANTB: CPT

## 2022-10-24 PROCEDURE — 83935 ASSAY OF URINE OSMOLALITY: CPT

## 2022-10-24 PROCEDURE — C1889: CPT

## 2022-10-24 PROCEDURE — 84436 ASSAY OF TOTAL THYROXINE: CPT

## 2022-10-24 PROCEDURE — 82962 GLUCOSE BLOOD TEST: CPT

## 2022-10-24 PROCEDURE — 99232 SBSQ HOSP IP/OBS MODERATE 35: CPT | Mod: GC

## 2022-10-24 PROCEDURE — 86900 BLOOD TYPING SEROLOGIC ABO: CPT

## 2022-10-24 PROCEDURE — 99024 POSTOP FOLLOW-UP VISIT: CPT

## 2022-10-24 PROCEDURE — A9585: CPT

## 2022-10-24 PROCEDURE — 86803 HEPATITIS C AB TEST: CPT

## 2022-10-24 PROCEDURE — 97161 PT EVAL LOW COMPLEX 20 MIN: CPT

## 2022-10-24 PROCEDURE — 81003 URINALYSIS AUTO W/O SCOPE: CPT

## 2022-10-24 PROCEDURE — 70553 MRI BRAIN STEM W/O & W/DYE: CPT

## 2022-10-24 PROCEDURE — 88342 IMHCHEM/IMCYTCHM 1ST ANTB: CPT

## 2022-10-24 PROCEDURE — 86850 RBC ANTIBODY SCREEN: CPT

## 2022-10-24 PROCEDURE — 84300 ASSAY OF URINE SODIUM: CPT

## 2022-10-24 PROCEDURE — 88305 TISSUE EXAM BY PATHOLOGIST: CPT

## 2022-10-24 PROCEDURE — 83036 HEMOGLOBIN GLYCOSYLATED A1C: CPT

## 2022-10-24 PROCEDURE — 36415 COLL VENOUS BLD VENIPUNCTURE: CPT

## 2022-10-24 PROCEDURE — 84100 ASSAY OF PHOSPHORUS: CPT

## 2022-10-24 RX ORDER — CEPHALEXIN 500 MG
1 CAPSULE ORAL
Qty: 10 | Refills: 0
Start: 2022-10-24 | End: 2022-10-28

## 2022-10-24 RX ORDER — OXYCODONE HYDROCHLORIDE 5 MG/1
1 TABLET ORAL
Qty: 18 | Refills: 0
Start: 2022-10-24 | End: 2022-10-26

## 2022-10-24 RX ORDER — ASPIRIN/CALCIUM CARB/MAGNESIUM 324 MG
1 TABLET ORAL
Qty: 0 | Refills: 0 | DISCHARGE

## 2022-10-24 RX ORDER — IBUPROFEN 200 MG
1 TABLET ORAL
Qty: 0 | Refills: 0 | DISCHARGE

## 2022-10-24 RX ADMIN — POLYETHYLENE GLYCOL 3350 17 GRAM(S): 17 POWDER, FOR SOLUTION ORAL at 14:38

## 2022-10-24 RX ADMIN — Medication 1 SPRAY(S): at 05:45

## 2022-10-24 RX ADMIN — Medication 650 MILLIGRAM(S): at 15:47

## 2022-10-24 RX ADMIN — OXYCODONE HYDROCHLORIDE 10 MILLIGRAM(S): 5 TABLET ORAL at 05:45

## 2022-10-24 RX ADMIN — Medication 1 SPRAY(S): at 14:37

## 2022-10-24 RX ADMIN — OXYCODONE HYDROCHLORIDE 10 MILLIGRAM(S): 5 TABLET ORAL at 06:45

## 2022-10-24 RX ADMIN — Medication 650 MILLIGRAM(S): at 16:29

## 2022-10-24 RX ADMIN — Medication 100 MILLIGRAM(S): at 05:45

## 2022-10-24 RX ADMIN — Medication 100 MILLIGRAM(S): at 14:37

## 2022-10-24 NOTE — DISCHARGE NOTE PROVIDER - CARE PROVIDER_API CALL
DAmico, Randy S (MD)  Neurosurgery  [address]  [place], [place] [address]  Phone: [phone]  Fax: [phone]  Follow Up Time:

## 2022-10-24 NOTE — PROGRESS NOTE ADULT - SUBJECTIVE AND OBJECTIVE BOX
HPI:  Presented for evaluation 8/17/22 and endorsed he went to optometrist for left vision changes Sept 2020 for which he was sent to ER for further workup. He was dx with pituitary adenoma. He endorses difficulty seeing neurosurgeon s/t insurance issues. His most recent MRI done 5/21/22 with report unchanged.      He endorses continued left eye left side blurriness. He denies tunnel vision. Notes intermittent left eye double vision.      He denies dizziness, headaches, abnormal growth of hands/feet, abnormal growth of facial features, significant unexplained weight loss/gain, abnormal hair growth/loss, or galactorrhea.     He saw endocrinologist Dr. Jim in [place] 2 months ago and states labs were WNL, he does not have report with him for today's visit.      Patient referred to see neuro-opth Brandon Black and plan made for him to repeat MRI pituitary, CT max/face.      Patient returned 9/23/22 and MRI Sella done 9/11/22 reviewed. Plan made for surgical intervention 10/21/22.     Patient again referred to see neuro- opth Brandon Black and plan made for pre-op CT max/face and see ENT Dr. Birmingham.     TODAY patient present for planned endoscopic endonasal transphenoidal resection of pituitary tumor.     PCP: Dr. Isela Powell      Endo: Dr. Mey Jim  (21 Oct 2022 07:15)      Subjective:  Patient reports mild headache and received tylenol and oxycodone.  Hospital course:  10/21: POD0 TSS pituitary tumor resection. DI watch. endo consulted  10/22: POD1, NOMAN overnight, NA and UOP stable. Enalapril held due to low BP this am. Given 1L bolus of plasmalyte for euvolemia. Keep fowler for now, strict Is and Os. Repeat BMP this afternoon. Gaston removed, cont fowler for strict is and os on SDU. BMP and specific gravity pend 8pm. Worked well with PT/OT today, home no needs. MRI completed.  10/23: POD2, overnight increased urine output, patient drinking to thirst, sodium uptrending , urine specific gravity <1.005, and Cr uptrending. 1mcg DDAVP IV and 1L NS bolus given. Urine output improved.  10/24: POD3, NOMAN overnight, neuro stable     Vital Signs Last 24 Hrs  T(C): 36.8 (23 Oct 2022 20:16), Max: 36.9 (23 Oct 2022 05:47)  T(F): 98.3 (23 Oct 2022 20:16), Max: 98.4 (23 Oct 2022 05:47)  HR: 91 (23 Oct 2022 20:16) (80 - 104)  BP: 143/86 (23 Oct 2022 20:16) (120/77 - 150/80)  BP(mean): --  RR: 19 (23 Oct 2022 20:16) (17 - 19)  SpO2: 97% (23 Oct 2022 20:16) (95% - 98%)    Parameters below as of 23 Oct 2022 20:16  Patient On (Oxygen Delivery Method): room air        I&O's Summary    22 Oct 2022 07:01  -  23 Oct 2022 07:00  --------------------------------------------------------  IN: 3170 mL / OUT: 5435 mL / NET: -2265 mL    23 Oct 2022 07:01  -  24 Oct 2022 00:30  --------------------------------------------------------  IN: 420 mL / OUT: 1500 mL / NET: -1080 mL        PHYSICAL EXAM:  General: patient seen laying supine in bed in NAD  Neuro: AAOx3, follows commands, opens eyes spontaneously, speech clear and fluent, CNII-XI grossly intact, face symmetric, no pronator drift, strength 5/5 b/l upper extremities and lower extremities, sensation intact to light touch throughout  HEENT: PERRL, EOMI  Neck: supple  Cardiac: RRR, S1S2  Pulmonary: chest rise symmetric  Abdomen: soft, nontender, nondistended  Ext: perfusing well  Skin: warm, dry      TUBES/LINES:  [x] Fowler  [] Lumbar Drain  [] Wound Drains  [] Others      DIET:  [] NPO  [x] Mechanical  [] Tube feeds    LABS:                        10.7   7.60  )-----------( 254      ( 23 Oct 2022 05:30 )             33.1     10-23    140  |  107  |  15  ----------------------------<  106<H>  4.1   |  25  |  1.19    Ca    7.8<L>      23 Oct 2022 16:00  Phos  2.3     10-23  Mg     2.2     10-23        Urinalysis Basic - ( 22 Oct 2022 22:00 )    Color: x / Appearance: x / SG: <=1.005 / pH: x  Gluc: x / Ketone: x  / Bili: x / Urobili: x   Blood: x / Protein: x / Nitrite: x   Leuk Esterase: x / RBC: x / WBC x   Sq Epi: x / Non Sq Epi: x / Bacteria: x          CAPILLARY BLOOD GLUCOSE          Drug Levels: [] N/A    CSF Analysis: [] N/A      Allergies    No Known Allergies    Intolerances      MEDICATIONS:  Antibiotics:  ceFAZolin   IVPB 2000 milliGRAM(s) IV Intermittent every 8 hours    Neuro:  acetaminophen     Tablet .. 650 milliGRAM(s) Oral every 6 hours PRN  ondansetron Injectable 4 milliGRAM(s) IV Push every 6 hours PRN  oxyCODONE    IR 5 milliGRAM(s) Oral every 4 hours PRN  oxyCODONE    IR 10 milliGRAM(s) Oral every 4 hours PRN    Anticoagulation:  enoxaparin Injectable 40 milliGRAM(s) SubCutaneous every 24 hours    OTHER:  artificial tears (preservative free) Ophthalmic Solution 1 Drop(s) Both EYES every 4 hours PRN  bisacodyl 5 milliGRAM(s) Oral daily PRN  dextrose 50% Injectable 25 Gram(s) IV Push once  dextrose 50% Injectable 12.5 Gram(s) IV Push once  dextrose 50% Injectable 25 Gram(s) IV Push once  dextrose Oral Gel 15 Gram(s) Oral once PRN  glucagon  Injectable 1 milliGRAM(s) IntraMuscular once  influenza   Vaccine 0.5 milliLiter(s) IntraMuscular once  polyethylene glycol 3350 17 Gram(s) Oral daily  senna 2 Tablet(s) Oral at bedtime  sodium chloride 0.65% Nasal 1 Spray(s) Both Nostrils three times a day    IVF:  dextrose 5%. 1000 milliLiter(s) IV Continuous <Continuous>  dextrose 5%. 1000 milliLiter(s) IV Continuous <Continuous>    CULTURES:    RADIOLOGY & ADDITIONAL TESTS:    D35.2    Handoff    MEWS Score    Arthritis    Seasonal allergies    HTN (hypertension)    Pituitary tumor    Sellar or suprasellar mass    Total surgical removal of neoplasm of pituitary gland by endoscopic endonasal transsphenoidal approach    No significant past surgical history    H/O hernia repair    H/O shoulder surgery    Room Service Assist    SysAdmin_VstLnk        ASSESSMENT:  63y/o M with PMHx HTN, athritis, endorses continued left side blurriness since September 2020. He went to an optometrist August 2022 for which he was sent to the ER for further workup. He was diagnosed with pituitary adenoma. He saw endocrinologist Dr. Jim in [place] 2 months ago and states labs were WNL. Pt is now s/p endoscopic transphenoidal resection of pituitary tumor 10/21/22    PLAN:  Neuro:  -neuro q4hrs/vitals q4hr  -DI watch  -skull base precautions  -nasal splints b/l per ENT  -MRI sella postop completed 10/22    Cardiac:   -SBP goal 100-140  -cont home enalapril     PulM:  -room air  -No NC    Renal:  -IVL   -strict I's and O's  -fowler in place    GI:  -regular diet   -bowel regimen    Endo:  -ISS  -endocrinology following, f/u recs    -ID:  -postop ancef while nasal splints in place per ENT    DispoL  SDU status, full code, PT rec no needs    D/w Dr. D'Amico       Assessment:  Present when checked    []  GCS  E   V  M     Heart Failure: []Acute, [] acute on chronic , []chronic  Heart Failure:  [] Diastolic (HFpEF), [] Systolic (HFrEF), []Combined (HFpEF and HFrEF), [] RHF, [] Pulm HTN, [] Other    [] SHASHI, [] ATN, [] AIN, [] other  [] CKD1, [] CKD2, [] CKD 3, [] CKD 4, [] CKD 5, []ESRD    Encephalopathy: [] Metabolic, [] Hepatic, [] toxic, [] Neurological, [] Other    Abnormal Nurtitional Status: [] malnurtition (see nutrition note), [ ]underweight: BMI < 19, [] morbid obesity: BMI >40, [] Cachexia    [] Sepsis  [] hypovolemic shock,[] cardiogenic shock, [] hemorrhagic shock, [] neuogenic shock  [] Acute Respiratory Failure  []Cerebral edema, [] Brain compression/ herniation,   [] Functional quadriplegia  [] Acute blood loss anemia   HPI:  Presented for evaluation 8/17/22 and endorsed he went to optometrist for left vision changes Sept 2020 for which he was sent to ER for further workup. He was dx with pituitary adenoma. He endorses difficulty seeing neurosurgeon s/t insurance issues. His most recent MRI done 5/21/22 with report unchanged.      He endorses continued left eye left side blurriness. He denies tunnel vision. Notes intermittent left eye double vision.      He denies dizziness, headaches, abnormal growth of hands/feet, abnormal growth of facial features, significant unexplained weight loss/gain, abnormal hair growth/loss, or galactorrhea.     He saw endocrinologist Dr. Jim in [place] 2 months ago and states labs were WNL, he does not have report with him for today's visit.      Patient referred to see neuro-opth Brandon Black and plan made for him to repeat MRI pituitary, CT max/face.      Patient returned 9/23/22 and MRI Sella done 9/11/22 reviewed. Plan made for surgical intervention 10/21/22.     Patient again referred to see neuro- opth Brandon Black and plan made for pre-op CT max/face and see ENT Dr. Birmingham.     TODAY patient present for planned endoscopic endonasal transphenoidal resection of pituitary tumor.     PCP: Dr. Isela Powell      Endo: Dr. Mey Jim  (21 Oct 2022 07:15)    Russian : Sukh ID# [id number]    Subjective:  Patient reports mild headache and received tylenol and oxycodone.  Hospital course:  10/21: POD0 TSS pituitary tumor resection. DI watch. endo consulted  10/22: POD1, NOMAN overnight, NA and UOP stable. Enalapril held due to low BP this am. Given 1L bolus of plasmalyte for euvolemia. Keep fowler for now, strict Is and Os. Repeat BMP this afternoon. Rossy removed, cont fowler for strict is and os on SDU. BMP and specific gravity pend 8pm. Worked well with PT/OT today, home no needs. MRI completed.  10/23: POD2, overnight increased urine output, patient drinking to thirst, sodium uptrending , urine specific gravity <1.005, and Cr uptrending. 1mcg DDAVP IV and 1L NS bolus given. Urine output improved.  10/24: POD3, NOMAN overnight, neuro stable     Vital Signs Last 24 Hrs  T(C): 36.8 (23 Oct 2022 20:16), Max: 36.9 (23 Oct 2022 05:47)  T(F): 98.3 (23 Oct 2022 20:16), Max: 98.4 (23 Oct 2022 05:47)  HR: 91 (23 Oct 2022 20:16) (80 - 104)  BP: 143/86 (23 Oct 2022 20:16) (120/77 - 150/80)  BP(mean): --  RR: 19 (23 Oct 2022 20:16) (17 - 19)  SpO2: 97% (23 Oct 2022 20:16) (95% - 98%)    Parameters below as of 23 Oct 2022 20:16  Patient On (Oxygen Delivery Method): room air        I&O's Summary    22 Oct 2022 07:01  -  23 Oct 2022 07:00  --------------------------------------------------------  IN: 3170 mL / OUT: 5435 mL / NET: -2265 mL    23 Oct 2022 07:01  -  24 Oct 2022 00:30  --------------------------------------------------------  IN: 420 mL / OUT: 1500 mL / NET: -1080 mL        PHYSICAL EXAM:  General: patient seen laying supine in bed in NAD  Neuro: AAOx3, follows commands, opens eyes spontaneously, speech clear and fluent, CNII-XI grossly intact, face symmetric, no pronator drift, strength 5/5 b/l upper extremities and lower extremities, sensation intact to light touch throughout  HEENT: PERRL, EOMI  Neck: supple  Cardiac: RRR, S1S2  Pulmonary: chest rise symmetric  Abdomen: soft, nontender, nondistended  Ext: perfusing well  Skin: warm, dry      TUBES/LINES:  [x] Fowler  [] Lumbar Drain  [] Wound Drains  [] Others      DIET:  [] NPO  [x] Mechanical  [] Tube feeds    LABS:                        10.7   7.60  )-----------( 254      ( 23 Oct 2022 05:30 )             33.1     10-23    140  |  107  |  15  ----------------------------<  106<H>  4.1   |  25  |  1.19    Ca    7.8<L>      23 Oct 2022 16:00  Phos  2.3     10-23  Mg     2.2     10-23        Urinalysis Basic - ( 22 Oct 2022 22:00 )    Color: x / Appearance: x / SG: <=1.005 / pH: x  Gluc: x / Ketone: x  / Bili: x / Urobili: x   Blood: x / Protein: x / Nitrite: x   Leuk Esterase: x / RBC: x / WBC x   Sq Epi: x / Non Sq Epi: x / Bacteria: x          CAPILLARY BLOOD GLUCOSE          Drug Levels: [] N/A    CSF Analysis: [] N/A      Allergies    No Known Allergies    Intolerances      MEDICATIONS:  Antibiotics:  ceFAZolin   IVPB 2000 milliGRAM(s) IV Intermittent every 8 hours    Neuro:  acetaminophen     Tablet .. 650 milliGRAM(s) Oral every 6 hours PRN  ondansetron Injectable 4 milliGRAM(s) IV Push every 6 hours PRN  oxyCODONE    IR 5 milliGRAM(s) Oral every 4 hours PRN  oxyCODONE    IR 10 milliGRAM(s) Oral every 4 hours PRN    Anticoagulation:  enoxaparin Injectable 40 milliGRAM(s) SubCutaneous every 24 hours    OTHER:  artificial tears (preservative free) Ophthalmic Solution 1 Drop(s) Both EYES every 4 hours PRN  bisacodyl 5 milliGRAM(s) Oral daily PRN  dextrose 50% Injectable 25 Gram(s) IV Push once  dextrose 50% Injectable 12.5 Gram(s) IV Push once  dextrose 50% Injectable 25 Gram(s) IV Push once  dextrose Oral Gel 15 Gram(s) Oral once PRN  glucagon  Injectable 1 milliGRAM(s) IntraMuscular once  influenza   Vaccine 0.5 milliLiter(s) IntraMuscular once  polyethylene glycol 3350 17 Gram(s) Oral daily  senna 2 Tablet(s) Oral at bedtime  sodium chloride 0.65% Nasal 1 Spray(s) Both Nostrils three times a day    IVF:  dextrose 5%. 1000 milliLiter(s) IV Continuous <Continuous>  dextrose 5%. 1000 milliLiter(s) IV Continuous <Continuous>    CULTURES:    RADIOLOGY & ADDITIONAL TESTS:    D35.2    Handoff    MEWS Score    Arthritis    Seasonal allergies    HTN (hypertension)    Pituitary tumor    Sellar or suprasellar mass    Total surgical removal of neoplasm of pituitary gland by endoscopic endonasal transsphenoidal approach    No significant past surgical history    H/O hernia repair    H/O shoulder surgery    Room Service Assist    NadirVstLnk        ASSESSMENT:  61y/o M with PMHx HTN, athritis, endorses continued left side blurriness since September 2020. He went to an optometrist August 2022 for which he was sent to the ER for further workup. He was diagnosed with pituitary adenoma. He saw endocrinologist Dr. Jim in [place] 2 months ago and states labs were WNL. Pt is now s/p endoscopic transphenoidal resection of pituitary tumor 10/21/22    PLAN:  Neuro:  -neuro q4hrs/vitals q4hr  -DI watch  -skull base precautions  -nasal splints b/l per ENT  -MRI sella postop completed 10/22    Cardiac:   -SBP goal 100-140  -cont home enalapril     PulM:  -room air  -No NC    Renal:  -IVL   -strict I's and O's  -fowler in place    GI:  -regular diet   -bowel regimen    Endo:  -ISS  -endocrinology following, f/u recs    -ID:  -postop ancef while nasal splints in place per ENT    DispoL  SDU status, full code, PT rec no needs    D/w Dr. D'Amico       Assessment:  Present when checked    []  GCS  E   V  M     Heart Failure: []Acute, [] acute on chronic , []chronic  Heart Failure:  [] Diastolic (HFpEF), [] Systolic (HFrEF), []Combined (HFpEF and HFrEF), [] RHF, [] Pulm HTN, [] Other    [] SHASHI, [] ATN, [] AIN, [] other  [] CKD1, [] CKD2, [] CKD 3, [] CKD 4, [] CKD 5, []ESRD    Encephalopathy: [] Metabolic, [] Hepatic, [] toxic, [] Neurological, [] Other    Abnormal Nurtitional Status: [] malnurtition (see nutrition note), [ ]underweight: BMI < 19, [] morbid obesity: BMI >40, [] Cachexia    [] Sepsis  [] hypovolemic shock,[] cardiogenic shock, [] hemorrhagic shock, [] neuogenic shock  [] Acute Respiratory Failure  []Cerebral edema, [] Brain compression/ herniation,   [] Functional quadriplegia  [] Acute blood loss anemia

## 2022-10-24 NOTE — PROGRESS NOTE ADULT - ATTENDING COMMENTS
Pt seen on rounds this afternoon and events of the weekend reviewed  Complains of moderate frontal HA, but no visual disturbances.  Also denies any rhinorrhea.  EOMs are full, and no obvious visual field deficits are elicited on confrontational testing.    Sin turgor is normal, and he denies any thirst.  Beard growth (with the pt not having shaved since the day before surgery) appears normal  Urine output was apparently quite high on 10/22, and he received a single dose of 1 mcg dDAVP.  U/O in the past 24 hours was down to 2.7 liters.  He reports daytime urination every 2-3 hours.  Last dose of dexamethasone was yesterday.  Serum cortisol normal at 13 mcg/dl today  --Can discharge off hydrocortisone and levothyroxine replacement  --There remains a question as to whether he has partial DI, since his U/O yesterday was still above normal and still reflects the dDAVP.  This will need to be assessed as outpatient.  Pt has an outside endocrinologist, and was told to contact her tomorrow about his discharge

## 2022-10-24 NOTE — DISCHARGE NOTE PROVIDER - HOSPITAL COURSE
HPI:   63y/o M with PMHx HTN, athritis, endorses continued left side blurriness since September 2020. He went to an optometrist August 2022 for which he was sent to the ER for further workup. He was diagnosed with pituitary adenoma. He saw endocrinologist Dr. Jim in [place] 2 months ago and states labs were WNL. Pt is now s/p endoscopic transphenoidal resection of pituitary tumor 10/21/22.    HOSPITAL COURSE:   10/21: POD0 TSS pituitary tumor resection. DI watch. endo consulted  10/22: POD1, NOMAN overnight, NA and UOP stable. Enalapril held due to low BP this am. Given 1L bolus of plasmalyte for euvolemia. Keep fowler for now, strict Is and Os. Repeat BMP this afternoon. Rossy removed, cont fowler for strict is and os on SDU. BMP and specific gravity pend 8pm. Worked well with PT/OT today, home no needs. MRI completed.  10/23: POD2, overnight increased urine output, patient drinking to thirst, sodium uptrending , urine specific gravity <1.005, and Cr uptrending. 1mcg DDAVP IV and 1L NS bolus given. Urine output improved.  10/24: POD3, NOMAN overnight, neuro stable. Pain well controlled. Normonatremic.        HPI:   61y/o M with PMHx HTN, athritis, endorses continued left side blurriness since September 2020. He went to an optometrist August 2022 for which he was sent to the ER for further workup. He was diagnosed with pituitary adenoma. He saw endocrinologist Dr. Jim in [place] 2 months ago and states labs were WNL. Pt is now s/p endoscopic transphenoidal resection of pituitary tumor 10/21/22.    HOSPITAL COURSE:   10/21: POD0 TSS pituitary tumor resection. DI watch. endo consulted  10/22: POD1, NOMAN overnight, NA and UOP stable. Enalapril held due to low BP this am. Given 1L bolus of plasmalyte for euvolemia. Keep fowler for now, strict Is and Os. Repeat BMP this afternoon. Rossy removed, cont fowler for strict is and os on SDU. BMP and specific gravity pend 8pm. Worked well with PT/OT today, home no needs. MRI completed.  10/23: POD2, overnight increased urine output, patient drinking to thirst, sodium uptrending , urine specific gravity <1.005, and Cr uptrending. 1mcg DDAVP IV and 1L NS bolus given. Urine output improved.  10/24: POD3, NOMAN overnight, neuro stable. Pain well controlled. Normonatremic.     Patient evaluated by PT/OT who recommened: Home no needs    Hospital course c/b:     Exam on day of discharge:  General: patient seen laying supine in bed in NAD  Neuro: AAOx3, FC, OE spontaneously, speech clear and fluent, CNII-XI grossly intact, face symmetric, no pronator drift, finger to nose intact, strength 5/5 b/l UE and LE, sensation intact to light touch throughout  HEENT: PERRL, EOMI  Neck: supple  Cardiac: RRR, S1S2  Pulmonary: chest rise symmetric  Abdomen: soft, nontender, nondistended  Ext: perfusing well  Skin: warm, dry  Wound: None    Checklist:   - Obtained follow up appointment from NP  - Reviewed final recommendations of inpatient consults  - review discharge planning on provider handoff  - post op imaging completed  - Neurologically stable for discharge  - Vitals stable for discharge   - Afebrile for discharge  - WBC is stable  - Sodium level is normal  - Pain is adequately controlled  - Pt has PICC/walker/brace/collar          HPI:   61y/o M with PMHx HTN, athritis, endorses continued left side blurriness since September 2020. He went to an optometrist August 2022 for which he was sent to the ER for further workup. He was diagnosed with pituitary adenoma. He saw endocrinologist Dr. Jim in [place] 2 months ago and states labs were WNL. Pt is now s/p endoscopic transphenoidal resection of pituitary tumor 10/21/22.    HOSPITAL COURSE:   10/21: POD0 TSS pituitary tumor resection. DI watch. endo consulted  10/22: POD1, NOMAN overnight, NA and UOP stable. Enalapril held due to low BP this am. Given 1L bolus of plasmalyte for euvolemia. Keep fowler for now, strict Is and Os. Repeat BMP this afternoon. Rossy removed, cont fowler for strict is and os on SDU. BMP and specific gravity pend 8pm. Worked well with PT/OT today, home no needs. MRI completed.  10/23: POD2, overnight increased urine output, patient drinking to thirst, sodium uptrending , urine specific gravity <1.005, and Cr uptrending. 1mcg DDAVP IV and 1L NS bolus given. Urine output improved.  10/24: POD3, NOMAN overnight, neuro stable. Pain well controlled. Normonatremic.     Patient evaluated by PT/OT who recommened: Home no needs    Hospital course c/b: uncomplicated    Exam on day of discharge:  General: patient seen laying supine in bed in NAD  Neuro: AAOx3, FC, OE spontaneously, speech clear and fluent, CNII-XI grossly intact, face symmetric, no pronator drift, finger to nose intact, strength 5/5 b/l UE and LE, sensation intact to light touch throughout  HEENT: PERRL, EOMI  Neck: supple  Cardiac: RRR, S1S2  Pulmonary: chest rise symmetric  Abdomen: soft, nontender, nondistended  Ext: perfusing well  Skin: warm, dry  Wound: None    Checklist: Neurologically stable, Vitals stable, Na stable, pain adequately controlled, WBC stable, output stable.

## 2022-10-24 NOTE — DISCHARGE NOTE PROVIDER - NSDCACTIVITY_GEN_ALL_CORE
No heavy lifting/straining/Follow Instructions Provided by your Surgical Team Showering allowed/Stairs allowed/Walking - Indoors allowed/No heavy lifting/straining/Walking - Outdoors allowed/Follow Instructions Provided by your Surgical Team

## 2022-10-24 NOTE — DISCHARGE NOTE PROVIDER - NSDCFUADDAPPT_GEN_ALL_CORE_FT
Please follow up with Dr. D'Amico in office in 1-2 weeks.     Please follow up with Dr. Birmingham in his office on Thursday 10/27.     Please follow up with your primary care provider.  Please follow up with Dr. D'Amico in office in 1-2 weeks.     Please follow up with Dr. Birmingham in his office on Thursday 10/27.     Please follow upwith home endocrinologist.     Please follow up with your primary care provider.

## 2022-10-24 NOTE — PROGRESS NOTE ADULT - PROVIDER SPECIALTY LIST ADULT
ENT
ENT
Endocrinology
NSICU
Neurosurgery
ENT
Endocrinology
NSICU
Neurosurgery
NSICU

## 2022-10-24 NOTE — PROGRESS NOTE ADULT - ASSESSMENT
Assessment/Plan:  62y Male s/p TSPR for nonsecretory pituitary macroadenoma 10/21/22. No complications. DI watch s/p 1mcg DDAVP 10/22  - management of increased UOP per primary team  - janeth per primary team  - ancef while Laurent splints are in place (6-8 days)  - continue with nasal saline sprays  - continue to monitor UOP  - continue to monitor electrolytes  - follow-up MRI  - page ENT with questions/concerns  - okay for discharge from ENT perspective  - Upon discharge, please instruct patient to follow up with Dr. Birmingham on Thursday 10/27. Call his office to schedule an appointment.

## 2022-10-24 NOTE — PROGRESS NOTE ADULT - SUBJECTIVE AND OBJECTIVE BOX
ENT [facility] DAILY PROGRESS NOTE    Overnight events/Interval HPI:  Patient is a 62M POD1 s/p TSPR for nonsecretory pituitary macroadenoma. Patient had baseline bitemporal hemianopsia and intermittent left eye diplopia prior to surgery. He denies dizziness, headaches, abnormal growth of hands/feet, abnormal growth of facial features, significant unexplained weight loss/gain, abnormal hair growth/loss, or galactorrhea.   Interval 10/22: No acute events overnight. Afebrile, vitals stable. Seen and examined this morning during AM rounds. No epistaxis, no post-nasal drip, no salty/metallic tastes.  Interval 10/23: Increased UOP overnight. Received 1 mcg DDAVP. Complained of dizziness overnight, now improved. Urine spec grav: < 1.005, Urine osmolality: 345 mosm/kg, Urine Na: <20 mmol/L. No epistaxis, no PND, no salty/metallic tastes. MRI done overnight, pending final radiologist read.   10-24 Patient seen at bedside and discussed with attending. No issues overnight. Decreased urine output. MRI read as post op changes, decompression of optic chiasm, residual tumor surrounding right cavernous ICA.     ALLERGIES  No Known Allergies    MEDICATIONS  (STANDING):  ceFAZolin   IVPB 2000 milliGRAM(s) IV Intermittent every 8 hours  dextrose 5%. 1000 milliLiter(s) (100 mL/Hr) IV Continuous <Continuous>  dextrose 5%. 1000 milliLiter(s) (50 mL/Hr) IV Continuous <Continuous>  dextrose 50% Injectable 25 Gram(s) IV Push once  dextrose 50% Injectable 12.5 Gram(s) IV Push once  dextrose 50% Injectable 25 Gram(s) IV Push once  enoxaparin Injectable 40 milliGRAM(s) SubCutaneous every 24 hours  glucagon  Injectable 1 milliGRAM(s) IntraMuscular once  influenza   Vaccine 0.5 milliLiter(s) IntraMuscular once  polyethylene glycol 3350 17 Gram(s) Oral daily  senna 2 Tablet(s) Oral at bedtime  sodium chloride 0.65% Nasal 1 Spray(s) Both Nostrils three times a day    MEDICATIONS  (PRN):  acetaminophen     Tablet .. 650 milliGRAM(s) Oral every 6 hours PRN Temp greater or equal to 38C (100.4F), Mild Pain (1 - 3)  artificial tears (preservative free) Ophthalmic Solution 1 Drop(s) Both EYES every 4 hours PRN Dry Eyes  bisacodyl 5 milliGRAM(s) Oral daily PRN Constipation  dextrose Oral Gel 15 Gram(s) Oral once PRN Blood Glucose LESS THAN 70 milliGRAM(s)/deciliter  ondansetron Injectable 4 milliGRAM(s) IV Push every 6 hours PRN Nausea and/or Vomiting  oxyCODONE    IR 5 milliGRAM(s) Oral every 4 hours PRN Moderate Pain (4 - 6)  oxyCODONE    IR 10 milliGRAM(s) Oral every 4 hours PRN Severe Pain (7 - 10)    LABS                         10.7   7.60  )-----------( 254      ( 23 Oct 2022 05:30 )             33.1    10-23    140  |  107  |  15  ----------------------------<  106<H>  4.1   |  25  |  1.19    Ca    7.8<L>      23 Oct 2022 16:00  Phos  2.3     10-23  Mg     2.2     10-23    INs & OUTs  Drains:     VITAL SIGNS:  ICU Vital Signs Last 24 Hrs  T(C): 36.9 (24 Oct 2022 04:46), Max: 36.9 (24 Oct 2022 04:46)  T(F): 98.4 (24 Oct 2022 04:46), Max: 98.4 (24 Oct 2022 04:46)  HR: 71 (24 Oct 2022 04:46) (68 - 91)  BP: 157/86 (24 Oct 2022 04:46) (128/74 - 157/86)  BP(mean): --  ABP: --  ABP(mean): --  RR: 17 (24 Oct 2022 04:46) (17 - 19)  SpO2: 96% (24 Oct 2022 04:46) (95% - 97%)    O2 Parameters below as of 24 Oct 2022 04:46  Patient On (Oxygen Delivery Method): room air    PHYSICAL EXAM:ENT EXAM-   Constitutional: No acute distress    Head:  normocephalic, atraumatic.   Ears:  Ear canals both clear.    Nose:  Septum intact, midline. Laurent splints in place bilaterally.  OC/OP:  No blood visualized in the posterior oropharynx  Neck:  Trachea midline.  Thyroid, parotid and submandibular glands normal.  Lymph:  No cervical adenopathy.

## 2022-10-24 NOTE — PROGRESS NOTE ADULT - TIME BILLING
Time spent reviewing pertinent data, examination patient, formulating plate and reviewing this information with the patient and answering questions.
Discharge regimen, discussion with pt

## 2022-10-24 NOTE — DISCHARGE NOTE NURSING/CASE MANAGEMENT/SOCIAL WORK - PATIENT PORTAL LINK FT
You can access the FollowMyHealth Patient Portal offered by [facility] by registering at the following website: [email or web address]. By joining [employer]’s FollowMyHealth portal, you will also be able to view your health information using other applications (apps) compatible with our system.

## 2022-10-24 NOTE — DISCHARGE NOTE NURSING/CASE MANAGEMENT/SOCIAL WORK - NSDCPEFALRISK_GEN_ALL_CORE
For information on Fall & Injury Prevention, visit: [email or web address] OR  [email or web address] OR  https://www.cdc.gov/steadi/patient.html

## 2022-10-24 NOTE — DISCHARGE NOTE PROVIDER - NSDCCPCAREPLAN_GEN_ALL_CORE_FT
PRINCIPAL DISCHARGE DIAGNOSIS  Diagnosis: Pituitary adenoma  Assessment and Plan of Treatment:        PRINCIPAL DISCHARGE DIAGNOSIS  Diagnosis: Pituitary adenoma  Assessment and Plan of Treatment:       SECONDARY DISCHARGE DIAGNOSES  Diagnosis: SHASHI (acute kidney injury)  Assessment and Plan of Treatment: SHASHI during this admission, unclear etiology, improved with hydration and stopped nephrotoxic medications.

## 2022-10-24 NOTE — DISCHARGE NOTE PROVIDER - NSDCFUADDINST_GEN_ALL_CORE_FT
Neurosurgery follow up appointment date/time:  - Laurent splints will remain in place for 6-8 days, please follow up wit Dr. Birmingham on Thursday 10/27.   - please call the office to confirm appointment: [phone]    Wound Care:  - Pt may shower, but no bathing.   - Laurent splints will remain in place until follow up appointment with ENT.     Activity:  - fatigue is common after surgery, rest if you feel tired   - no bending, lifting, twisting or heavy lifting   - walking is recommended, ambulate as tolerated  - you may shower when you get home, keep your incision dry  - no bathing   - no driving within 24 hours of anesthesia or while taking prescription pain medications   - keep hydrated, drink plenty of water   - skullbase precautions: no nose blowing, sneezing with mouth open, no drinking out of a straw, no straining      Inpatient consults:  - final recommendations  - you will need follow up with Dr. Birmingham (ENT) and endocrinology.   - Continue using nasal saline spray and ancef while Laurent splints are in place.      Please also follow up with your primary care doctor.     Pain Expectations:  - pain after surgery is expected  - please take pain meds as prescribed     Medications:  - continue taking home enalapril as previously prescribed,   - For pain ok to take over the counter tylenol as needed, if pain persists ok to take 1-2 tablets of oxycodone every 4 hours (total of 5-10 mg).   - pain medications can cause constipation, you should eat a high fiber diet and may take a stool softener while on pain meds   - Avoid taking Advil (ibuprofen), Motrin (naproxen) for pain as they can cause bleeding     Call the office or come to ED if:  - wound has drainage or bleeding, increased redness or pain at incision site, neurological change, fever (>101), chills, night sweats, syncope, nausea/vomiting      Playback:  - discharge instructions are uploaded to [employer].     WITHIN 24 HOURS OF DISCHARGE, PLEASE CONTACT NEURO PA  WITH ANY QUESTIONS OR CONCERNS: [phone]   OTHERWISE, PLEASE CALL THE OFFICE WITH ANY QUESTIONS OR CONCERNS:  Neurosurgery follow up appointment date/time:  - Laurent splints will remain in place for 6-8 days, please follow up wit Dr. Birmingham on Thursday 10/27.   - please call the office to confirm appointment: [phone]    Wound Care:  - Pt may shower, but no bathing.   - Laurent splints will remain in place until follow up appointment with ENT.     Activity:  - fatigue is common after surgery, rest if you feel tired   - no bending, lifting, twisting or heavy lifting   - walking is recommended, ambulate as tolerated  - you may shower when you get home, keep your incision dry  - no bathing   - no driving within 24 hours of anesthesia or while taking prescription pain medications   - keep hydrated, drink plenty of water   - skullbase precautions: no nose blowing, sneezing with mouth open, no drinking out of a straw, no straining      Inpatient consults:  - final recommendations  - you will need follow up with Dr. Birmingham (ENT) and endocrinology.   - Continue using nasal saline spray and Keflex while Laurent splints are in place.      Please also follow up with your primary care doctor.     Pain Expectations:  - pain after surgery is expected  - please take pain meds as prescribed     Medications:  - continue taking home enalapril as previously prescribed, hold aspirin and ibuprofen.   - For pain ok to take over the counter tylenol as needed, if pain persists ok to take 1-2 tablets of oxycodone every 4 hours (total of 5-10 mg).   - pain medications can cause constipation, you should eat a high fiber diet and may take a stool softener while on pain meds   - Avoid taking Advil (ibuprofen), Motrin (naproxen) for pain as they can cause bleeding. You may resume home aspirin and ibuprofen on post op day 7 (10/28).     Call the office or come to ED if:  - wound has drainage or bleeding, increased redness or pain at incision site, neurological change, fever (>101), chills, night sweats, syncope, nausea/vomiting      Playback:  - discharge instructions are uploaded to [employer].     WITHIN 24 HOURS OF DISCHARGE, PLEASE CONTACT NEURO PA  WITH ANY QUESTIONS OR CONCERNS: [phone]   OTHERWISE, PLEASE CALL THE OFFICE WITH ANY QUESTIONS OR CONCERNS:

## 2022-10-24 NOTE — DISCHARGE NOTE PROVIDER - NSDCMRMEDTOKEN_GEN_ALL_CORE_FT
Aspir 81 oral delayed release tablet: 1 tab(s) orally once a day  atorvastatin: orally once a day  enalapril 5 mg oral tablet: orally 2 times a day   mg oral tablet: 1 tab(s) orally every 6 hours, As Needed  Tylenol 500 mg oral tablet: 2 tab(s) orally every 6 hours, As Needed   atorvastatin: orally once a day  cephalexin 500 mg oral tablet: 1 tab(s) orally 2 times a day MDD:Take max 2 times a day  enalapril 5 mg oral tablet: orally 2 times a day  oxyCODONE 5 mg oral tablet: 1 tab(s) orally every 4 hours, As needed, Moderate Pain (4 - 6) MDD:Take 1-2 tablets for breakthrough pain every 4 hours as needed, max 12 tablets per day  Tylenol 500 mg oral tablet: 2 tab(s) orally every 6 hours, As Needed

## 2022-10-24 NOTE — DISCHARGE NOTE NURSING/CASE MANAGEMENT/SOCIAL WORK - NSDCVIVACCINE_GEN_ALL_CORE_FT
Tdap; 16-Mar-2020 17:47; Ibeth Currie (ALEJANDRA); [employer]; [id number] (Exp. Date: 19-Mar-2022); IntraMuscular; Deltoid Right.; 0.5 milliLiter(s); VIS (VIS Published: 09-May-2013, VIS Presented: 16-Mar-2020);

## 2022-10-24 NOTE — PROGRESS NOTE ADULT - SUBJECTIVE AND OBJECTIVE BOX
OVERNIGHT: No acute events overnight.   SUBJECTIVE: Patient was seen and examined this morning. He complained of experiencing frontal headache; otherwise, he didn't have any other complaints. His total T4 was 6.25, serum sodium 136 and morning cortisol 13. Patient's urinary output over the past 24 hours was 2.7 liters. He says that he's urinating every ~2-3 hours. Per primary team., he's planned for discharge tomorrow.     REVIEW OF SYSTEMS  Constitutional:  Negative fever, chills or loss of appetite.  Eyes:  Negative blurry vision or double vision.  Cardiovascular:  Negative for chest pain or palpitations.  Respiratory:  Negative for cough, wheezing, or shortness of breath.   Gastrointestinal:  Negative for nausea, vomiting, diarrhea, constipation, or abdominal pain.  Neurologic:  (+) Headache. No confusion, dizziness, lightheadedness.    PHYSICAL EXAM  Vital Signs Last 24 Hrs  T(C): 37.2 (24 Oct 2022 12:30), Max: 37.2 (24 Oct 2022 12:30)  T(F): 99 (24 Oct 2022 12:30), Max: 99 (24 Oct 2022 12:30)  HR: 69 (24 Oct 2022 12:30) (69 - 69)  BP: 134/72 (24 Oct 2022 12:30) (134/72 - 134/72)  BP(mean): --  RR: 16 (24 Oct 2022 12:30) (16 - 16)  SpO2: 97% (24 Oct 2022 12:30) (97% - 97%)    Parameters below as of 24 Oct 2022 12:30  Patient On (Oxygen Delivery Method): room air    Constitutional: Awake, alert, male in no acute distress.   HEENT: Normocephalic, atraumatic, LEI.  Respiratory: Lungs clear to ausculation bilaterally.   Cardiovascular: regular rhythm, normal S1 and S2, no audible murmurs.   GI: soft, non-tender, non-distended, bowel sounds present.  Extremities: No lower extremity edema.  Psychiatric: AAO x 3. Normal affect/mood.     LABS                        11.1   7.04  )-----------( 262      ( 24 Oct 2022 05:30 )             34.1     10-24    136  |  102  |  13  ----------------------------<  106<H>  4.5   |  27  |  1.22    Ca    8.8      24 Oct 2022 05:30  Phos  2.9     10-24  Mg     2.1     10-24    ASSESSMENT / RECOMMENDATIONS  Mr. Newton is a 62-year-old male with a past medical history of pituitary macroadenoma, hypertension and arthritis who was admitted for further management of his pituitary adenoma s/p transphenoidal resection (10/21/22). Endocrinology was consulted for recommendations regarding post-operative management of pituitary adenoma.     Weight: 61 kg  BMI: 22  Creatinine: 1.13  GFR: 73    # Pituitary macroadenoma s/p resection  - Patient's morning cortisol and T4 are within normal limits. His urinary output is somewhat elevated but tolerable. Patient is planned for discharge today.   - He should follow-up with his endocrinologist for repeat hormonal testing as outpatient.     Thank you for allowing us to participate in the care of Mr. Newton.   Will continue to monitor.       Case discussed with Dr. Villa. Primary team updated.       Torin Webster    Endocrinology Fellow    Service Pager: [phone]

## 2022-10-24 NOTE — DISCHARGE NOTE NURSING/CASE MANAGEMENT/SOCIAL WORK - NSDCFUADDAPPT_GEN_ALL_CORE_FT
Please follow up with Dr. D'Amico in office in 1-2 weeks.     Please follow up with Dr. Birmingham in his office on Thursday 10/27.     Please follow upwith home endocrinologist.     Please follow up with your primary care provider.

## 2022-10-24 NOTE — DISCHARGE NOTE PROVIDER - NSDCFUSCHEDAPPT_GEN_ALL_CORE_FT
Myrna Linder  [facility]  NEUROSURG [address]  Scheduled Appointment: 11/08/2022

## 2022-10-27 ENCOUNTER — APPOINTMENT (OUTPATIENT)
Dept: OTOLARYNGOLOGY | Facility: CLINIC | Age: 62
End: 2022-10-27

## 2022-10-27 VITALS
DIASTOLIC BLOOD PRESSURE: 85 MMHG | HEART RATE: 68 BPM | WEIGHT: 135 LBS | SYSTOLIC BLOOD PRESSURE: 128 MMHG | TEMPERATURE: 95.6 F | BODY MASS INDEX: 23.05 KG/M2 | HEIGHT: 64 IN

## 2022-10-27 LAB — SURGICAL PATHOLOGY STUDY: SIGNIFICANT CHANGE UP

## 2022-10-27 PROCEDURE — 99024 POSTOP FOLLOW-UP VISIT: CPT

## 2022-10-27 PROCEDURE — 31237 NSL/SINS NDSC SURG BX POLYPC: CPT | Mod: 58

## 2022-10-27 NOTE — CONSULT LETTER
[Dear  ___] : Dear  [unfilled], [Courtesy Letter:] : I had the pleasure of seeing your patient, [unfilled], in my office today. [Consult Closing:] : Thank you very much for allowing me to participate in the care of this patient.  If you have any questions, please do not hesitate to contact me. [Sincerely,] : Sincerely, [FreeTextEntry3] : Richardson Birmingham MD\par Department of Otolaryngology - Head and Neck Surgery\par [facility] [DrYasmin  ___] : Dr. COLLIER [DrYasmin ___] : Dr. COLLIER

## 2022-10-27 NOTE — DATA REVIEWED
[de-identified] : MRI Sella 9/11/22:\par FINDINGS:\par \par Since prior examination there has been slight interval growth in a large enhancing mass within the sella and suprasellar cisterns. There is significant upper mass effect upon the optic chiasm. The mass measures 3.7 cm in craniocaudal extent. The mass likely invades the bilateral cavernous sinuses and completely surrounds the right cavernous ICA and partially surrounds the left cavernous ICA, series 9 image 8.\par \par The ventricles and sulci are within normal limits.\par \par There are no areas of abnormal signal or enhancement within the brain parenchyma.\par \par There is no intraparenchymal hematoma, mass effect or midline shift.\par \par No abnormal extra-axial fluid collections are present.\par \par The calvarium is intact.\par \par The visualized intraorbital compartments, paranasal sinuses and tympanomastoid cavities appear free of acute disease.\par \par \par IMPRESSION:\par Since prior MR sella of 5/21/2022:\par \par There has been slight continued increase in size of a pituitary macroadenoma with invasion of the bilateral cavernous sinuses as well as significant mass effect upon the optic chiasm..\par

## 2022-10-27 NOTE — PROCEDURE
[FreeTextEntry3] : doyles removed\par \par Nasal Endoscopy:\par debris suctioned\par nasal airways patent\par absorbables in sphenoid, left in place; transmitted pulsations, no gross CSF leak\par no mucopus

## 2022-10-27 NOTE — HISTORY OF PRESENT ILLNESS
[de-identified] : 62M here in first postoperative visit s/p endoscopic transsphenoidal approach for pituitary macroadenoma resection 10/21/22. Intraoperatively, no CSF leak and sella reconstructed with free mucosal graft.\par \par He is doing well since surgery. There is congestion and mild facial pressure, but overall doing well. No clear nasal drainage. His vision is improved since surgery.\par \par ROS otherwise unremarkable.

## 2022-10-27 NOTE — ASSESSMENT
[FreeTextEntry1] : 62M here in first postoperative visit s/p endoscopic transsphenoidal approach for pituitary macroadenoma resection 10/21/22. Intraoperatively, no CSF leak and sella reconstructed with free mucosal graft. He is doing well since surgery. There is congestion and mild facial pressure, but overall doing well. No clear nasal drainage. His vision is improved since surgery. On exam, there are expected postsurgical changes with patent nasal airways.\par He is doing well. For now, start saline throughout the day. Maintain nasal precautions. RTO 3-4 weeks for debridement. Nsgy f/u as scheduled.\par

## 2022-10-30 DIAGNOSIS — M19.90 UNSPECIFIED OSTEOARTHRITIS, UNSPECIFIED SITE: ICD-10-CM

## 2022-10-30 DIAGNOSIS — I10 ESSENTIAL (PRIMARY) HYPERTENSION: ICD-10-CM

## 2022-10-30 DIAGNOSIS — H47.099 OTHER DISORDERS OF OPTIC NERVE, NOT ELSEWHERE CLASSIFIED, UNSPECIFIED EYE: ICD-10-CM

## 2022-10-30 DIAGNOSIS — N52.9 MALE ERECTILE DYSFUNCTION, UNSPECIFIED: ICD-10-CM

## 2022-10-30 DIAGNOSIS — D35.2 BENIGN NEOPLASM OF PITUITARY GLAND: ICD-10-CM

## 2022-10-30 DIAGNOSIS — N17.9 ACUTE KIDNEY FAILURE, UNSPECIFIED: ICD-10-CM

## 2022-10-31 ENCOUNTER — NON-APPOINTMENT (OUTPATIENT)
Age: 62
End: 2022-10-31

## 2022-11-04 ENCOUNTER — NON-APPOINTMENT (OUTPATIENT)
Age: 62
End: 2022-11-04

## 2022-11-06 RX ORDER — ENALAPRIL MALEATE 5 MG/1
5 TABLET ORAL
Refills: 0 | Status: ACTIVE | COMMUNITY

## 2022-11-06 RX ORDER — ATORVASTATIN CALCIUM 80 MG/1
TABLET, FILM COATED ORAL
Refills: 0 | Status: ACTIVE | COMMUNITY

## 2022-11-08 ENCOUNTER — APPOINTMENT (OUTPATIENT)
Dept: NEUROSURGERY | Facility: CLINIC | Age: 62
End: 2022-11-08

## 2022-11-14 ENCOUNTER — NON-APPOINTMENT (OUTPATIENT)
Age: 62
End: 2022-11-14

## 2022-11-14 ENCOUNTER — APPOINTMENT (OUTPATIENT)
Dept: NEUROSURGERY | Facility: CLINIC | Age: 62
End: 2022-11-14

## 2022-11-14 VITALS
HEART RATE: 76 BPM | OXYGEN SATURATION: 97 % | DIASTOLIC BLOOD PRESSURE: 88 MMHG | HEIGHT: 64 IN | BODY MASS INDEX: 23.05 KG/M2 | SYSTOLIC BLOOD PRESSURE: 145 MMHG | TEMPERATURE: 98.1 F | RESPIRATION RATE: 18 BRPM | WEIGHT: 135 LBS

## 2022-11-14 DIAGNOSIS — H54.62 UNQUALIFIED VISUAL LOSS, LEFT EYE, NORMAL VISION RIGHT EYE: ICD-10-CM

## 2022-11-14 PROCEDURE — 99024 POSTOP FOLLOW-UP VISIT: CPT

## 2022-11-14 RX ORDER — ATORVASTATIN CALCIUM 10 MG/1
10 TABLET, FILM COATED ORAL
Qty: 30 | Refills: 0 | Status: ACTIVE | COMMUNITY
Start: 2022-04-16

## 2022-11-14 NOTE — REASON FOR VISIT
[de-identified] : endoscopic endonasal approach for resection of pituitary adenoma  [de-identified] : 10/21/22 [Interpreters_IDNumber] : [id number] [Interpreters_FullName] : Jr [TWNoteComboBox1] : [place]

## 2022-11-14 NOTE — ASSESSMENT
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 Now S/P endoscopic endonasal approach for resection of pituitary adenoma 10/21/22. \par PATH: pituitary adenoma\par \par He is recovering well, left eye vision improved since surgery. With intermittent headaches, relief with tylenol. \par He reported 1 episode of bright red nasal drainage about 1 week ago. No more episodes of nasal drainage.  \par \par He has seen ENT Dr. Birmingham and is scheduled to see again later this month. \par Has also seen endocrinologist and plans to see opth later this week. \par \par Educated to continue nasal precautions and to notify if any nasal drainage, vision changes, new/worsening headaches or focal neuro deficits. \par \par Will repeat MRI pituitary w/wo 3 months post op. \par He should RTC after MRI to review with Dr. D'Amico. \par \par Patient verbalizes understanding of today’s discussion and next steps in treatment plan. \par  \par

## 2022-11-14 NOTE — PHYSICAL EXAM
[General Appearance - Alert] : alert [General Appearance - In No Acute Distress] : in no acute distress [General Appearance - Well-Appearing] : healthy appearing [Clean] : clean [Dry] : dry [No Drainage] : without drainage [Oriented To Time, Place, And Person] : oriented to person, place, and time [Impaired Insight] : insight and judgment were intact [Affect] : the affect was normal [Memory Recent] : recent memory was not impaired [Motor Tone] : muscle tone was normal in all four extremities [Motor Strength] : muscle strength was normal in all four extremities [Sensation Tactile Decrease] : light touch was intact [Sclera] : the sclera and conjunctiva were normal [PERRL With Normal Accommodation] : pupils were equal in size, round, reactive to light, with normal accommodation [Extraocular Movements] : extraocular movements were intact [Neck Appearance] : the appearance of the neck was normal [] : no respiratory distress [Respiration, Rhythm And Depth] : normal respiratory rhythm and effort [Abnormal Walk] : normal gait [Skin Color & Pigmentation] : normal skin color and pigmentation [FreeTextEntry5] : CN II- XII grossly intact, left blurred vision reported on exam

## 2022-11-14 NOTE — REVIEW OF SYSTEMS
[As Noted in HPI] : as noted in HPI [Fever] : no fever [Chills] : no chills [Confused or Disoriented] : no confusion [Facial Weakness] : no facial weakness [Arm Weakness] : no arm weakness [Hand Weakness] : no hand weakness [Leg Weakness] : no leg weakness [Numbness] : no numbness [Tingling] : no tingling [Seizures] : no convulsions [Dizziness] : no dizziness [Nasal Discharge] : no nasal discharge [Chest Pain] : no chest pain [Palpitations] : no palpitations [Shortness Of Breath] : no shortness of breath [Cough] : no cough

## 2022-11-14 NOTE — HISTORY OF PRESENT ILLNESS
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 \par Now S/P endoscopic endonasal approach for resection of pituitary adenoma 10/21/22. \par \par Pt presented for evaluation 8/17/22 with Dr. D'Amico and endorsed he went to optometrist for left vision changes Sept 2020 for which he was sent to ER for further workup. He was dx with pituitary adenoma. He endorses difficulty seeing neurosurgeon s/t insurance issues. His most recent MRI done 5/21/22 with report unchanged. \par He has continued left eye left side blurriness. He denies tunnel vision. Notes intermittent left eye double vision. \par He saw endocrinologist Dr. Jim in [place] 2 months ago and states labs were WNL, he does not have report with him for today's visit. \par \par Patient returned 9/23/22 and MRI Sella done 9/11/22 reviewed. Plan made for surgical intervention. \par \par 10/21/22 S/P endoscopic endonasal approach for resection of pituitary adenoma. \par PATH: pituitary adenoma\par \par Hospital Course: \par 10/21: POD0 TSS pituitary tumor resection. DI watch. endo consulted\par 10/22: POD1, NOMAN overnight, NA and UOP stable. Enalapril held due to low BP this am. Given 1L bolus of plasmalyte for euvolemia. Keep fowler for now, strict Is and Os. Repeat BMP this afternoon. Dewitt removed, cont fowler for strict is and os on SDU. BMP and specific gravity pend 8pm. Worked well with PT/OT today, home no needs. MRI completed.\par 10/23: POD2, overnight increased urine output, patient drinking to thirst, sodium uptrending , urine specific gravity <1.005, and Cr uptrending. 1mcg DDAVP IV and 1L NS bolus given. Urine output improved.\par 10/24: POD3, NOMAN overnight, neuro stable. Pain well controlled. Normonatremic. Pending repeat cortisol. Fowler out, pending TOV. Keflex 500 BID while splints in per ENT until f/u Thursday. \par \par TODAY patient returns for post op follow- up. \par He saw Dr. Birmingham 10/27 for nasal splint removal. \par He states overall recovering well; denies seizure activity, dizziness, new/worsening focal neuro deficits.\par Notes intermittent headaches, pressure like, which he takes tyelnol prn for relief. \par He states bright red nasal drainage x 1 episode about 1 week ago. Denies any further episodes of nasal drainage. \par States pre op left eye diplopia has improved since surgery. \par He notes he has apt with ophthalmologist later this week, Dr. Bill Gibson?\par \par He states has new endocrinologist Dr. Snow Conti- he has seen her since surgery and endorses labs WNL and plans to repeat in 2 months. \par \par Has restarted aspirin 81 mg. \par \par PCP: Dr. Isela Powell \par

## 2022-11-14 NOTE — DATA REVIEWED
[de-identified] : \par \par \par ACC: [id number] EXAM: MR SELLA ONLY WAW IC\par \par PROCEDURE DATE: 10/22/2022\par \par \par \par INTERPRETATION: PROCEDURE: MRI Sella with and without intravenous contrast\par \par INDICATION: Postoperative evaluation\par \par TECHNIQUE: Sagittal and coronal T1 and coronal T2 weighted images of the sella and axial FLAIR images of the brain were obtained. Following the intravenous administration of 6 mL IV Gadavist, sagittal and coronal T1 weighted and coronal VIBE images through the sella and sagittal 3D T1 weighted images through the brain were obtained.\par \par COMPARISON: CT maxillofacial 10/2/2022, MRI sella 9/11/[phone].\par \par FINDINGS: Status post transsphenoidal section of expansile sellar/suprasellar mass. There is fluid within the paranasal sinuses and nasal cavity secondary to postsurgical changes. There is heterogeneous material within the bilateral sphenoid sinuses secondary to postsurgical changes.\par \par There is been interval resection of the sellar/suprasellar mass. There is a surgical resection cavity within the sphenoid sinus extending into the sella. There has been interval resolution of mass effect on the optic chiasm and pituitary stock. The infundibulum is deviated to the left. There is residual hypoenhancing soft tissue along the right lateral aspect of the sella/suprasellar region, right cavernous sinus which is surrounding the cavernous right ICA. This measures 1.7 cm AP by 1.2 cm transverse by 1.7 cm craniocaudal (series 12 image 9 and series 11 image 6) and is suspicious for residual tumor.\par \par Evaluation of the brain demonstrates the ventricles, cisternal spaces and cortical sulci to be appropriate for the patient stated age. There is no midline shift or extra-axial collections. No abnormal signal is identified within the brain parenchyma. The postcontrast images demonstrate no abnormal enhancement.\par \par IMPRESSION:\par \par Status post resection of sellar/suprasellar mass with associated postsurgical changes as above. Resolution of mass effect on the optic chiasm. 1.7 cm hypoenhancing soft tissue surrounding the cavernous right ICA suspicious for residual tumor as detailed above.\par \par --- End of Report ---\par \par \par \par \par

## 2022-11-18 ENCOUNTER — APPOINTMENT (OUTPATIENT)
Dept: OTOLARYNGOLOGY | Facility: CLINIC | Age: 62
End: 2022-11-18

## 2022-11-18 PROCEDURE — 31237 NSL/SINS NDSC SURG BX POLYPC: CPT | Mod: 58

## 2022-11-18 PROCEDURE — 99024 POSTOP FOLLOW-UP VISIT: CPT

## 2022-11-18 NOTE — HISTORY OF PRESENT ILLNESS
[de-identified] : 62M here in postoperative visit s/p endoscopic transsphenoidal approach for pituitary macroadenoma resection 10/21/22. Intraoperatively, no CSF leak and sella reconstructed with free mucosal graft.\par \par He is doing well since surgery. He had mild epistaxis twice over the past two weeks, but both only for 1-2 minutes. He also c/o strange nasal over the past 2 weeks or so. There is minimal congestion,  but overall doing well. No clear nasal drainage. His vision is improved since surgery.\par \par ROS otherwise unremarkable.

## 2022-11-18 NOTE — ASSESSMENT
[FreeTextEntry1] : 62M here in routine postoperative visit s/p endoscopic transsphenoidal approach for pituitary macroadenoma resection 10/21/22. Intraoperatively, no CSF leak and sella reconstructed with free mucosal graft. He is doing well since surgery. He had mild epistaxis twice over the past two weeks, but both only for 1-2 minutes. He also c/o strange nasal over the past 2 weeks or so. There is minimal congestion, but overall doing well. No clear nasal drainage. His vision is improved since surgery. On exam, there are expected postsurgical changes with patent nasal airways and sphenoidotomy. Thick crusting was removed whereafter the nasal odor resolved.\par He is doing well. For now, start saline rinses daily and can continue the saline. Advance activity. RTO 8 weeks for debridement. Nsgy f/u as scheduled.\par

## 2022-11-18 NOTE — DATA REVIEWED
[de-identified] : MRI Sella 9/11/22:\par FINDINGS:\par \par Since prior examination there has been slight interval growth in a large enhancing mass within the sella and suprasellar cisterns. There is significant upper mass effect upon the optic chiasm. The mass measures 3.7 cm in craniocaudal extent. The mass likely invades the bilateral cavernous sinuses and completely surrounds the right cavernous ICA and partially surrounds the left cavernous ICA, series 9 image 8.\par \par The ventricles and sulci are within normal limits.\par \par There are no areas of abnormal signal or enhancement within the brain parenchyma.\par \par There is no intraparenchymal hematoma, mass effect or midline shift.\par \par No abnormal extra-axial fluid collections are present.\par \par The calvarium is intact.\par \par The visualized intraorbital compartments, paranasal sinuses and tympanomastoid cavities appear free of acute disease.\par \par \par IMPRESSION:\par Since prior MR sella of 5/21/2022:\par \par There has been slight continued increase in size of a pituitary macroadenoma with invasion of the bilateral cavernous sinuses as well as significant mass effect upon the optic chiasm..\par

## 2022-11-18 NOTE — CONSULT LETTER
[Dear  ___] : Dear  [unfilled], [Courtesy Letter:] : I had the pleasure of seeing your patient, [unfilled], in my office today. [Consult Closing:] : Thank you very much for allowing me to participate in the care of this patient.  If you have any questions, please do not hesitate to contact me. [Sincerely,] : Sincerely, [FreeTextEntry3] : Richardson Birmingham MD\par Department of Otolaryngology - Head and Neck Surgery\par [facility] [DrYasmin  ___] : Dr. COLLIER [DrYasmin ___] : Dr. COLLIER

## 2022-11-18 NOTE — PROCEDURE
[FreeTextEntry3] : Nasal Endoscopy:\par thick crusting and debris suctioned\par nasal airways widely patent\par absorbables in sphenoid, some suctioned free; transmitted pulsations, no gross CSF leak. some thin mucus, no gross mucopus

## 2023-01-29 ENCOUNTER — OUTPATIENT (OUTPATIENT)
Dept: OUTPATIENT SERVICES | Facility: HOSPITAL | Age: 63
LOS: 1 days | Discharge: HOME | End: 2023-01-29
Payer: MEDICAID

## 2023-01-29 ENCOUNTER — RESULT REVIEW (OUTPATIENT)
Age: 63
End: 2023-01-29

## 2023-01-29 DIAGNOSIS — D35.2 BENIGN NEOPLASM OF PITUITARY GLAND: ICD-10-CM

## 2023-01-29 DIAGNOSIS — Z98.890 OTHER SPECIFIED POSTPROCEDURAL STATES: Chronic | ICD-10-CM

## 2023-01-29 PROCEDURE — 70553 MRI BRAIN STEM W/O & W/DYE: CPT | Mod: 26

## 2023-02-23 ENCOUNTER — APPOINTMENT (OUTPATIENT)
Dept: OTOLARYNGOLOGY | Facility: CLINIC | Age: 63
End: 2023-02-23
Payer: MEDICAID

## 2023-02-23 PROCEDURE — 99213 OFFICE O/P EST LOW 20 MIN: CPT | Mod: 25

## 2023-02-23 PROCEDURE — 31231 NASAL ENDOSCOPY DX: CPT

## 2023-02-23 NOTE — CONSULT LETTER
[Dear  ___] : Dear  [unfilled], [Courtesy Letter:] : I had the pleasure of seeing your patient, [unfilled], in my office today. [Consult Closing:] : Thank you very much for allowing me to participate in the care of this patient.  If you have any questions, please do not hesitate to contact me. [Sincerely,] : Sincerely, [FreeTextEntry3] : Richardson Birmingham MD\par Department of Otolaryngology - Head and Neck Surgery\par [facility] [DrYasmin  ___] : Dr. COLLIER [DrYasmin ___] : Dr. COLLIER

## 2023-02-23 NOTE — HISTORY OF PRESENT ILLNESS
[de-identified] : 62M here in postoperative visit s/p endoscopic transsphenoidal approach for pituitary macroadenoma resection 10/21/22. Intraoperatively, no CSF leak and sella reconstructed with free mucosal graft.\par \par He is doing very well since last seen 4 months ago. There is no congestion, no nasal odor and he feels his vision has improved since surgery.\par \par ROS otherwise unremarkable.

## 2023-02-23 NOTE — PROCEDURE
[FreeTextEntry3] : Nasal Endoscopy:\par nasal airways widely patent\par sphenoidotomy widely patent, sella mucosalized, transmitted pulsations\par inferior sphenoid sinus/clival recess suctioned of thick mucus - patent\par no mucopus or polyps

## 2023-02-23 NOTE — ASSESSMENT
[FreeTextEntry1] : 62M here in routine postoperative visit s/p endoscopic transsphenoidal approach for pituitary macroadenoma resection 10/21/22. Intraoperatively, no CSF leak and sella reconstructed with free mucosal graft. He is doing very well since last seen 4 months ago. There is no congestion, no nasal odor and he feels his vision has improved since surgery. On exam, there are well healed postoperative changes with patent nasal airways and mucosalized sella. Some thick mucus was suctioned from the clival recesses.\par He is doing very well. Continue saline as needed. Normal activity. Neurosurg f/u as recommended. RTO 6 months.\par

## 2023-02-23 NOTE — DATA REVIEWED
[de-identified] : MRI Sella 9/11/22:\par FINDINGS:\par \par Since prior examination there has been slight interval growth in a large enhancing mass within the sella and suprasellar cisterns. There is significant upper mass effect upon the optic chiasm. The mass measures 3.7 cm in craniocaudal extent. The mass likely invades the bilateral cavernous sinuses and completely surrounds the right cavernous ICA and partially surrounds the left cavernous ICA, series 9 image 8.\par \par The ventricles and sulci are within normal limits.\par \par There are no areas of abnormal signal or enhancement within the brain parenchyma.\par \par There is no intraparenchymal hematoma, mass effect or midline shift.\par \par No abnormal extra-axial fluid collections are present.\par \par The calvarium is intact.\par \par The visualized intraorbital compartments, paranasal sinuses and tympanomastoid cavities appear free of acute disease.\par \par \par IMPRESSION:\par Since prior MR sella of 5/21/2022:\par \par There has been slight continued increase in size of a pituitary macroadenoma with invasion of the bilateral cavernous sinuses as well as significant mass effect upon the optic chiasm..\par

## 2023-03-25 PROBLEM — D35.2 PITUITARY MACROADENOMA: Status: ACTIVE | Noted: 2022-08-12

## 2023-03-25 PROBLEM — Z09 POSTOP CHECK: Status: ACTIVE | Noted: 2022-11-06

## 2023-03-25 PROBLEM — E89.3 STATUS POST TRANSSPHENOIDAL PITUITARY RESECTION: Status: ACTIVE | Noted: 2022-11-06

## 2023-03-25 PROBLEM — Z87.891 FORMER SMOKER: Status: ACTIVE | Noted: 2022-09-23

## 2023-03-29 ENCOUNTER — APPOINTMENT (OUTPATIENT)
Dept: NEUROSURGERY | Facility: CLINIC | Age: 63
End: 2023-03-29
Payer: MEDICAID

## 2023-03-29 DIAGNOSIS — Z87.891 PERSONAL HISTORY OF NICOTINE DEPENDENCE: ICD-10-CM

## 2023-03-29 DIAGNOSIS — Z09 ENCOUNTER FOR FOLLOW-UP EXAMINATION AFTER COMPLETED TREATMENT FOR CONDITIONS OTHER THAN MALIGNANT NEOPLASM: ICD-10-CM

## 2023-03-29 DIAGNOSIS — D35.2 BENIGN NEOPLASM OF PITUITARY GLAND: ICD-10-CM

## 2023-03-29 DIAGNOSIS — E89.3 POSTPROCEDURAL HYPOPITUITARISM: ICD-10-CM

## 2023-03-29 PROCEDURE — 99442: CPT | Mod: 95

## 2023-03-29 NOTE — HISTORY OF PRESENT ILLNESS
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 \par Now S/P endoscopic endonasal approach for resection of pituitary adenoma 10/21/22. \par \par _____\par Pt presented for evaluation 8/17/22 with Dr. D'Amico and endorsed he went to optometrist for left vision changes Sept 2020 for which he was sent to ER for further workup. He was dx with pituitary adenoma. He endorses difficulty seeing neurosurgeon s/t insurance issues. His most recent MRI done 5/21/22 with report unchanged. \par He has continued left eye left side blurriness. He denies tunnel vision. Notes intermittent left eye double vision. \par He saw endocrinologist Dr. Jim in [place] 2 months ago and states labs were WNL, he does not have report with him for today's visit. \par \par Patient returned 9/23/22 and MRI Sella done 9/11/22 reviewed. Plan made for surgical intervention. \par \par 10/21/22 S/P endoscopic endonasal approach for resection of pituitary adenoma. \par PATH: pituitary adenoma\par \par 11/14/22 post op: overall doing well, with intermittent pressure like headaches that are relieved with tylenol. Has restarted aspirin 81 mg. \par He states has new endocrinologist Dr. Snow Conti- he has seen her since surgery and endorses labs WNL and plans to repeat in 2 months. He has apt with ophthalmologist later this week, Dr. Bill Gibson?\par Plan made to repeat MRI @ 3 months post op. \par \par TODAY pt returns to review MRI with his son. \par Endorses vision has improved. Continues to f/u with endocrinologist, unsure of last labs. \par Denies headaches, dizziness, other new/worsening focal neuro deficits. \par \par \par PCP: Dr. Isela Powell

## 2023-03-29 NOTE — REVIEW OF SYSTEMS
[Fever] : no fever [Chills] : no chills [As Noted in HPI] : as noted in HPI [Chest Pain] : no chest pain [Palpitations] : no palpitations [Shortness Of Breath] : no shortness of breath

## 2023-03-29 NOTE — ASSESSMENT
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 Now S/P endoscopic endonasal approach for resection of pituitary adenoma 10/21/22 with Dr. D'Amico. 1/2/23 2 month post op MRI with stable residual. Pt doing well with reported improvement in vision since pre-op. Continues to follow with endocrinologist. \par \par Plan made to re-image in 6 months. \par RTC after MRI to review with Dr. D'Amico. \par \par Patient/ his son verbalize understanding of today’s discussion and next steps in treatment plan. \par

## 2023-03-29 NOTE — DATA REVIEWED
[de-identified] : \par \par \par \par ACC: [id number] EXAM: MR SELLA ONLY WAW IC ORDERED BY: Myrna Linder\par \par PROCEDURE DATE: 01/29/2023\par \par \par \par INTERPRETATION: Clinical indication: Pituitary adenoma, status post resection follow-up.\par \par Technique: Multiplanar multisequential MR imaging of the pituitary was performed before and after the intravenous administration of contrast, including dynamic imaging. Contrast: 7.5 mL of Gadavist was administered.\par \par Comparison: MR sella dated 10/22/2022 and 9/11/2022.\par \par Findings:\par \par The patient is again noted to be status post transsphenoidal resection of large sellar/suprasellar pituitary adenoma seen on the prior MR sella from 9/11/2022.\par \par There is redemonstrated residual mass in the right aspect of the sellar/suprasellar region encasing the right cavernous ICA without significant change compared to the prior MRI from 10/22/2022 (measuring 1.7 x 1 x 1.7 cm). There is been slight interval contraction of the residual mass along the floor of the enlarged bony sella, measuring 0.8 cm in thickness, previously 1.1 cm. There is stable leftward deviation of the infundibulum. There is no mass effect on the optic chiasm.\par \par There is incidental developmental venous anomaly in the left cerebellum.\par \par No acute infarction or intracranial hemorrhage.\par \par The ventricles are normal without evidence of hydrocephalus. There are no extra-axial fluid collections.\par \par The visualized intraorbital contents are normal. The imaged portions of the paranasal sinuses are clear. The mastoid air cells are clear.\par \par Impression:\par \par Redemonstrated postsurgical change reflecting transsphenoidal resection of pituitary adenoma.\par \par Stable residual mass in the right aspect of the sellar/suprasellar region compared to the prior sellar MRI dated 10/22/2022. Stable encasement of the right cavernous ICA.\par \par Mild interval contraction of the residual mass along the floor of the enlarged bony sella compared to the prior exam.\par \par --- End of Report ---\par \par \par

## 2023-03-29 NOTE — REASON FOR VISIT
[Home] : at home, [unfilled] , at the time of the visit. [Medical Office: ([facility])___] : at the medical office located in  [Family Member] : family member [Verbal consent obtained from patient] : the patient, [unfilled] [Follow-Up: _____] : a [unfilled] follow-up visit [FreeTextEntry1] : is status post endoscopic endonasal approach for resection 10/21/22. Review MRI

## 2023-10-24 ENCOUNTER — APPOINTMENT (OUTPATIENT)
Dept: OTOLARYNGOLOGY | Facility: CLINIC | Age: 63
End: 2023-10-24

## 2024-10-08 ENCOUNTER — INPATIENT (INPATIENT)
Facility: HOSPITAL | Age: 64
LOS: 2 days | Discharge: HOME | DRG: 313 | End: 2024-10-11
Attending: STUDENT IN AN ORGANIZED HEALTH CARE EDUCATION/TRAINING PROGRAM | Admitting: INTERNAL MEDICINE
Payer: MEDICARE

## 2024-10-08 VITALS
RESPIRATION RATE: 18 BRPM | TEMPERATURE: 98 F | OXYGEN SATURATION: 99 % | SYSTOLIC BLOOD PRESSURE: 158 MMHG | DIASTOLIC BLOOD PRESSURE: 54 MMHG | HEART RATE: 58 BPM

## 2024-10-08 DIAGNOSIS — Z98.890 OTHER SPECIFIED POSTPROCEDURAL STATES: Chronic | ICD-10-CM

## 2024-10-08 LAB
ALBUMIN SERPL ELPH-MCNC: 4.4 G/DL — SIGNIFICANT CHANGE UP (ref 3.5–5.2)
ALP SERPL-CCNC: 73 U/L — SIGNIFICANT CHANGE UP (ref 30–115)
ALT FLD-CCNC: 14 U/L — SIGNIFICANT CHANGE UP (ref 0–41)
ANION GAP SERPL CALC-SCNC: 8 MMOL/L — SIGNIFICANT CHANGE UP (ref 7–14)
AST SERPL-CCNC: 19 U/L — SIGNIFICANT CHANGE UP (ref 0–41)
BASOPHILS # BLD AUTO: 0.05 K/UL — SIGNIFICANT CHANGE UP (ref 0–0.2)
BASOPHILS NFR BLD AUTO: 0.9 % — SIGNIFICANT CHANGE UP (ref 0–1)
BILIRUB SERPL-MCNC: <0.2 MG/DL — SIGNIFICANT CHANGE UP (ref 0.2–1.2)
BUN SERPL-MCNC: 19 MG/DL — SIGNIFICANT CHANGE UP (ref 10–20)
CALCIUM SERPL-MCNC: 9.1 MG/DL — SIGNIFICANT CHANGE UP (ref 8.4–10.4)
CHLORIDE SERPL-SCNC: 107 MMOL/L — SIGNIFICANT CHANGE UP (ref 98–110)
CO2 SERPL-SCNC: 26 MMOL/L — SIGNIFICANT CHANGE UP (ref 17–32)
CREAT SERPL-MCNC: 1.3 MG/DL — SIGNIFICANT CHANGE UP (ref 0.7–1.5)
EGFR: 61 ML/MIN/1.73M2 — SIGNIFICANT CHANGE UP
EOSINOPHIL # BLD AUTO: 0.19 K/UL — SIGNIFICANT CHANGE UP (ref 0–0.7)
EOSINOPHIL NFR BLD AUTO: 3.3 % — SIGNIFICANT CHANGE UP (ref 0–8)
GLUCOSE SERPL-MCNC: 89 MG/DL — SIGNIFICANT CHANGE UP (ref 70–99)
HCT VFR BLD CALC: 42.1 % — SIGNIFICANT CHANGE UP (ref 42–52)
HGB BLD-MCNC: 13.8 G/DL — LOW (ref 14–18)
IMM GRANULOCYTES NFR BLD AUTO: 0.3 % — SIGNIFICANT CHANGE UP (ref 0.1–0.3)
LYMPHOCYTES # BLD AUTO: 1.94 K/UL — SIGNIFICANT CHANGE UP (ref 1.2–3.4)
LYMPHOCYTES # BLD AUTO: 33.3 % — SIGNIFICANT CHANGE UP (ref 20.5–51.1)
MAGNESIUM SERPL-MCNC: 2.2 MG/DL — SIGNIFICANT CHANGE UP (ref 1.8–2.4)
MCHC RBC-ENTMCNC: 29.9 PG — SIGNIFICANT CHANGE UP (ref 27–31)
MCHC RBC-ENTMCNC: 32.8 G/DL — SIGNIFICANT CHANGE UP (ref 32–37)
MCV RBC AUTO: 91.3 FL — SIGNIFICANT CHANGE UP (ref 80–94)
MONOCYTES # BLD AUTO: 0.57 K/UL — SIGNIFICANT CHANGE UP (ref 0.1–0.6)
MONOCYTES NFR BLD AUTO: 9.8 % — HIGH (ref 1.7–9.3)
NEUTROPHILS # BLD AUTO: 3.05 K/UL — SIGNIFICANT CHANGE UP (ref 1.4–6.5)
NEUTROPHILS NFR BLD AUTO: 52.4 % — SIGNIFICANT CHANGE UP (ref 42.2–75.2)
NRBC # BLD: 0 /100 WBCS — SIGNIFICANT CHANGE UP (ref 0–0)
NT-PROBNP SERPL-SCNC: 74 PG/ML — SIGNIFICANT CHANGE UP (ref 0–300)
PLATELET # BLD AUTO: 283 K/UL — SIGNIFICANT CHANGE UP (ref 130–400)
PMV BLD: 9.5 FL — SIGNIFICANT CHANGE UP (ref 7.4–10.4)
POTASSIUM SERPL-MCNC: 4.7 MMOL/L — SIGNIFICANT CHANGE UP (ref 3.5–5)
POTASSIUM SERPL-SCNC: 4.7 MMOL/L — SIGNIFICANT CHANGE UP (ref 3.5–5)
PROT SERPL-MCNC: 6.8 G/DL — SIGNIFICANT CHANGE UP (ref 6–8)
RBC # BLD: 4.61 M/UL — LOW (ref 4.7–6.1)
RBC # FLD: 13 % — SIGNIFICANT CHANGE UP (ref 11.5–14.5)
SODIUM SERPL-SCNC: 141 MMOL/L — SIGNIFICANT CHANGE UP (ref 135–146)
TROPONIN T, HIGH SENSITIVITY RESULT: 7 NG/L — SIGNIFICANT CHANGE UP (ref 6–21)
TROPONIN T, HIGH SENSITIVITY RESULT: 7 NG/L — SIGNIFICANT CHANGE UP (ref 6–21)
WBC # BLD: 5.82 K/UL — SIGNIFICANT CHANGE UP (ref 4.8–10.8)
WBC # FLD AUTO: 5.82 K/UL — SIGNIFICANT CHANGE UP (ref 4.8–10.8)

## 2024-10-08 PROCEDURE — 99223 1ST HOSP IP/OBS HIGH 75: CPT

## 2024-10-08 PROCEDURE — 71045 X-RAY EXAM CHEST 1 VIEW: CPT | Mod: 26

## 2024-10-08 PROCEDURE — 71275 CT ANGIOGRAPHY CHEST: CPT | Mod: 26,MC

## 2024-10-08 PROCEDURE — 70450 CT HEAD/BRAIN W/O DYE: CPT | Mod: 26,MC

## 2024-10-08 PROCEDURE — 74174 CTA ABD&PLVS W/CONTRAST: CPT | Mod: 26,MC

## 2024-10-08 RX ORDER — ACETAMINOPHEN 325 MG
975 TABLET ORAL ONCE
Refills: 0 | Status: COMPLETED | OUTPATIENT
Start: 2024-10-08 | End: 2024-10-08

## 2024-10-08 RX ORDER — REGADENOSON 0.08 MG/ML
0.4 INJECTION, SOLUTION INTRAVENOUS ONCE
Refills: 0 | Status: DISCONTINUED | OUTPATIENT
Start: 2024-10-08 | End: 2024-10-11

## 2024-10-08 RX ADMIN — Medication 975 MILLIGRAM(S): at 21:34

## 2024-10-08 NOTE — ED PROVIDER NOTE - PHYSICAL EXAMINATION
VITAL SIGNS: I have reviewed nursing notes and confirm.  CONSTITUTIONAL: non-toxic, well appearing  SKIN: no rash, no petechiae.  EYES:  EOMI, pink conjunctiva, anicteric  ENT: tongue midline, no exudates, MMM  NECK: Supple; no meningismus, no JVD  CARD: RRR, no murmurs, equal radial pulses bilaterally 2+  RESP: CTAB, no respiratory distress  ABD: Soft, non-tender, non-distended, no peritoneal signs, no HSM, no CVA tenderness

## 2024-10-08 NOTE — ED PROVIDER NOTE - OBJECTIVE STATEMENT
64-year-old male with a past medical history significant for hypertension to chest pain.  Patient states that for the last couple days he has been having left-sided chest pain radiating to his axilla.  Patient has the pain is worse with exacerbation. Pt has not had a cardiac evaluation. Pt denies any other medical complaints.

## 2024-10-08 NOTE — ED PROVIDER NOTE - CLINICAL SUMMARY MEDICAL DECISION MAKING FREE TEXT BOX
64 yr old m that presents with chest pain. labs, ekg, imaging. Labs and EKG were ordered and reviewed.  Imaging was ordered and reviewed by me.  Appropriate medications for patient's presenting complaints were ordered and effects were reassessed.  Patient's records (prior hospital, ED visit, and/or nursing home notes if available) were reviewed.  Additional history was obtained from EMS, family, and/or PCP (where available).  Escalation to admission/observation was considered.  Will place pt in obs for acs evaluation.

## 2024-10-08 NOTE — ED ADULT TRIAGE NOTE - SPO2 (%)
Subjective:       Patient ID: Levy Mckoy is a 52 y.o. male.    Chief Complaint: Follow-up    HPI   Follow-up  51yo male presents today for follow-up. He has been out of Metformin for management of prediabetes. Last visit was in February 2020 with A1c at that time of 5.9. He does not monitor blood glucose levels regularly. He does recall most recent level of 110. Denies any symptoms of hyper or hypoglycemia. There is a 5 pound weight gain noted since last check. He is not following any specific dietary regimen. He is working a physically demanding job. No exertional intolerance is reported. He maintains use of CPAP and is due for follow-up with Pulmonology. Notes ongoing rhinitis symptoms. No significant benefit with Atrovent use. While in [place] he took Clarinase (?) and he does take this on occasion for symptom relief. He has not tried any otc measures for symptom control. He has athlete's foot and has been applying kerasal otc without relief. Requesting RX measures. Health maintenance update is needed. There have been no recent ER visits or hospitalizations.    Review of Systems   Constitutional: Negative for fatigue and unexpected weight change.   HENT: Negative for congestion, ear pain and sinus pressure.    Eyes: Negative for visual disturbance.   Respiratory: Negative for cough and shortness of breath.    Cardiovascular: Negative for chest pain and palpitations.   Gastrointestinal: Negative for abdominal pain, diarrhea and nausea.   Endocrine: Negative for polydipsia and polyuria.   Genitourinary: Negative for decreased urine volume and difficulty urinating.   Musculoskeletal: Negative for arthralgias and myalgias.   Neurological: Negative for dizziness, weakness and headaches.   Psychiatric/Behavioral: Negative for dysphoric mood and sleep disturbance.       Objective:   /80   Pulse 86   Temp 97.5 °F (36.4 °C) (Temporal)   Resp 16   Ht 6' (1.829 m)   Wt 125 kg (275 lb 9.2 oz)   SpO2  97%   BMI 37.37 kg/m²   Physical Exam  Constitutional:       General: He is not in acute distress.     Appearance: He is well-developed. He is obese. He is not diaphoretic.   HENT:      Head: Normocephalic and atraumatic.      Right Ear: Tympanic membrane, ear canal and external ear normal.      Left Ear: Tympanic membrane, ear canal and external ear normal.      Nose: Nose normal.      Mouth/Throat:      Mouth: Mucous membranes are moist.   Eyes:      Extraocular Movements: Extraocular movements intact.      Conjunctiva/sclera: Conjunctivae normal.      Pupils: Pupils are equal, round, and reactive to light.   Neck:      Musculoskeletal: Normal range of motion and neck supple.   Cardiovascular:      Rate and Rhythm: Normal rate and regular rhythm.      Heart sounds: Normal heart sounds.   Pulmonary:      Effort: Pulmonary effort is normal.      Breath sounds: Normal breath sounds.   Abdominal:      General: Bowel sounds are normal.      Palpations: Abdomen is soft.   Musculoskeletal:         General: No swelling.   Skin:     General: Skin is warm and dry.      Findings: Erythema present.      Comments: Tinea pedis to both feet   Neurological:      Mental Status: He is alert and oriented to person, place, and time.   Psychiatric:         Mood and Affect: Mood normal.         Behavior: Behavior normal.         Assessment:       1. Prediabetes    2. Chronic rhinitis    3. Tinea pedis of both feet    4. JULI (obstructive sleep apnea)    5. Obesity (BMI 35.0-39.9 without comorbidity)        Plan:      Prediabetes  Recommend resuming Metformin and have advised he proceed with lab update. He has requested external orders ([facility]) and is aware of my pending departure. He is responsible for ensuring I have received his results.   -     Hemoglobin A1C; Future; Expected date: 11/05/2020  -     Lipid Panel; Future; Expected date: 11/05/2020  -     Comprehensive Metabolic Panel; Future; Expected date: 11/05/2020  -      metFORMIN (GLUCOPHAGE-XR) 500 MG ER 24hr tablet; Take 1 tablet by mouth once daily with breakfast  Dispense: 90 tablet; Refill: 0    Chronic rhinitis  Consider Loratadine.    Tinea pedis of both feet  -     ciclopirox (LOPROX) 0.77 % Crea; Apply topically 2 (two) times daily.  Dispense: 30 g; Refill: 1  -     Ambulatory referral/consult to Dermatology; Future; Expected date: 11/12/2020    JULI (obstructive sleep apnea)  Continue with CPAP use and follow-up as per Pulmonology.    Obesity (BMI 35.0-39.9 without comorbidity)  Weight loss efforts are encouraged through dietary and lifestyle measures.        99

## 2024-10-08 NOTE — ED CDU PROVIDER INITIAL DAY NOTE - CLINICAL SUMMARY MEDICAL DECISION MAKING FREE TEXT BOX
64 yr old m that presents with chest pain. labs, ekg, imaging. Labs and EKG were ordered and reviewed.  Imaging was ordered and reviewed by me.  Appropriate medications for patient's presenting complaints were ordered and effects were reassessed.  Patient's records (prior hospital, ED visit, and/or nursing home notes if available) were reviewed.  Additional history was obtained from EMS, family, and/or PCP (where available).  Escalation to admission/observation was considered.  will place in obs for nuclear stress test.

## 2024-10-08 NOTE — ED PROVIDER NOTE - ATTENDING APP SHARED VISIT CONTRIBUTION OF CARE
64-year-old male with a past medical history significant for hypertension to chest pain.  Patient states that for the last couple days he has been having left-sided chest pain radiating to his axilla.  Patient has the pain is worse with exacerbation. Pt has not had a cardiac evaluation. Pt denies any other medical complaints.     VITAL SIGNS: I have reviewed nursing notes and confirm.  CONSTITUTIONAL: non-toxic, well appearing  SKIN: no rash, no petechiae.  EYES:  EOMI, pink conjunctiva, anicteric  ENT: tongue midline, no exudates, MMM  NECK: Supple; no meningismus, no JVD  CARD: RRR, no murmurs, equal radial pulses bilaterally 2+  RESP: CTAB, no respiratory distress  ABD: Soft, non-tender, non-distended, no peritoneal signs, no HSM, no CVA tenderness        64 yr old m that presents with chest pain. labs, ekg, imaging. reassess. dispo pending.

## 2024-10-09 ENCOUNTER — RESULT REVIEW (OUTPATIENT)
Age: 64
End: 2024-10-09

## 2024-10-09 DIAGNOSIS — R07.9 CHEST PAIN, UNSPECIFIED: ICD-10-CM

## 2024-10-09 LAB
ANION GAP SERPL CALC-SCNC: 10 MMOL/L — SIGNIFICANT CHANGE UP (ref 7–14)
BUN SERPL-MCNC: 15 MG/DL — SIGNIFICANT CHANGE UP (ref 10–20)
CALCIUM SERPL-MCNC: 7.5 MG/DL — LOW (ref 8.4–10.5)
CHLORIDE SERPL-SCNC: 107 MMOL/L — SIGNIFICANT CHANGE UP (ref 98–110)
CO2 SERPL-SCNC: 20 MMOL/L — SIGNIFICANT CHANGE UP (ref 17–32)
CREAT SERPL-MCNC: 0.9 MG/DL — SIGNIFICANT CHANGE UP (ref 0.7–1.5)
EGFR: 94 ML/MIN/1.73M2 — SIGNIFICANT CHANGE UP
GLUCOSE SERPL-MCNC: 85 MG/DL — SIGNIFICANT CHANGE UP (ref 70–99)
POTASSIUM SERPL-MCNC: 3.6 MMOL/L — SIGNIFICANT CHANGE UP (ref 3.5–5)
POTASSIUM SERPL-SCNC: 3.6 MMOL/L — SIGNIFICANT CHANGE UP (ref 3.5–5)
SODIUM SERPL-SCNC: 137 MMOL/L — SIGNIFICANT CHANGE UP (ref 135–146)

## 2024-10-09 PROCEDURE — 92978 ENDOLUMINL IVUS OCT C 1ST: CPT | Mod: LD

## 2024-10-09 PROCEDURE — 36415 COLL VENOUS BLD VENIPUNCTURE: CPT

## 2024-10-09 PROCEDURE — C1894: CPT

## 2024-10-09 PROCEDURE — 83036 HEMOGLOBIN GLYCOSYLATED A1C: CPT

## 2024-10-09 PROCEDURE — 92928 PRQ TCAT PLMT NTRAC ST 1 LES: CPT | Mod: LD

## 2024-10-09 PROCEDURE — 93016 CV STRESS TEST SUPVJ ONLY: CPT

## 2024-10-09 PROCEDURE — 84443 ASSAY THYROID STIM HORMONE: CPT

## 2024-10-09 PROCEDURE — 93306 TTE W/DOPPLER COMPLETE: CPT | Mod: 26

## 2024-10-09 PROCEDURE — 99223 1ST HOSP IP/OBS HIGH 75: CPT

## 2024-10-09 PROCEDURE — 93010 ELECTROCARDIOGRAM REPORT: CPT

## 2024-10-09 PROCEDURE — 85027 COMPLETE CBC AUTOMATED: CPT

## 2024-10-09 PROCEDURE — 80048 BASIC METABOLIC PNL TOTAL CA: CPT

## 2024-10-09 PROCEDURE — 93018 CV STRESS TEST I&R ONLY: CPT

## 2024-10-09 PROCEDURE — 93458 L HRT ARTERY/VENTRICLE ANGIO: CPT | Mod: 59

## 2024-10-09 PROCEDURE — C1725: CPT

## 2024-10-09 PROCEDURE — 93458 L HRT ARTERY/VENTRICLE ANGIO: CPT | Mod: 26,XU

## 2024-10-09 PROCEDURE — 80061 LIPID PANEL: CPT

## 2024-10-09 PROCEDURE — 99233 SBSQ HOSP IP/OBS HIGH 50: CPT

## 2024-10-09 PROCEDURE — 93010 ELECTROCARDIOGRAM REPORT: CPT | Mod: 77

## 2024-10-09 PROCEDURE — 93005 ELECTROCARDIOGRAM TRACING: CPT

## 2024-10-09 PROCEDURE — 92978 ENDOLUMINL IVUS OCT C 1ST: CPT | Mod: 26,LD

## 2024-10-09 PROCEDURE — 78452 HT MUSCLE IMAGE SPECT MULT: CPT | Mod: 26,MC

## 2024-10-09 PROCEDURE — C1874: CPT

## 2024-10-09 PROCEDURE — C1769: CPT

## 2024-10-09 PROCEDURE — C9600: CPT | Mod: LD

## 2024-10-09 PROCEDURE — C1753: CPT

## 2024-10-09 PROCEDURE — C1887: CPT

## 2024-10-09 PROCEDURE — 93306 TTE W/DOPPLER COMPLETE: CPT

## 2024-10-09 RX ORDER — METOPROLOL TARTRATE 50 MG
12.5 TABLET ORAL DAILY
Refills: 0 | Status: DISCONTINUED | OUTPATIENT
Start: 2024-10-09 | End: 2024-10-10

## 2024-10-09 RX ORDER — ASPIRIN 325 MG
324 TABLET ORAL ONCE
Refills: 0 | Status: COMPLETED | OUTPATIENT
Start: 2024-10-09 | End: 2024-10-09

## 2024-10-09 RX ORDER — SODIUM CHLORIDE 0.9 % (FLUSH) 0.9 %
250 SYRINGE (ML) INJECTION ONCE
Refills: 0 | Status: COMPLETED | OUTPATIENT
Start: 2024-10-09 | End: 2024-10-09

## 2024-10-09 RX ORDER — AMLODIPINE BESYLATE 5 MG
2.5 TABLET ORAL DAILY
Refills: 0 | Status: ACTIVE | OUTPATIENT
Start: 2024-10-09 | End: 2025-09-07

## 2024-10-09 RX ORDER — ACETAMINOPHEN 325 MG
650 TABLET ORAL EVERY 6 HOURS
Refills: 0 | Status: DISCONTINUED | OUTPATIENT
Start: 2024-10-09 | End: 2024-10-11

## 2024-10-09 RX ORDER — ATORVASTATIN CALCIUM 10 MG/1
40 TABLET, FILM COATED ORAL AT BEDTIME
Refills: 0 | Status: DISCONTINUED | OUTPATIENT
Start: 2024-10-09 | End: 2024-10-11

## 2024-10-09 RX ORDER — ASPIRIN 325 MG
81 TABLET ORAL DAILY
Refills: 0 | Status: DISCONTINUED | OUTPATIENT
Start: 2024-10-09 | End: 2024-10-11

## 2024-10-09 RX ORDER — SODIUM CHLORIDE 0.9 % (FLUSH) 0.9 %
1000 SYRINGE (ML) INJECTION
Refills: 0 | Status: DISCONTINUED | OUTPATIENT
Start: 2024-10-09 | End: 2024-10-10

## 2024-10-09 RX ORDER — METOPROLOL TARTRATE 50 MG
25 TABLET ORAL DAILY
Refills: 0 | Status: DISCONTINUED | OUTPATIENT
Start: 2024-10-09 | End: 2024-10-09

## 2024-10-09 RX ORDER — ENALAPRIL MALEATE 5 MG
5 TABLET ORAL EVERY 12 HOURS
Refills: 0 | Status: DISCONTINUED | OUTPATIENT
Start: 2024-10-09 | End: 2024-10-09

## 2024-10-09 RX ADMIN — ATORVASTATIN CALCIUM 40 MILLIGRAM(S): 10 TABLET, FILM COATED ORAL at 21:21

## 2024-10-09 RX ADMIN — Medication 600 MILLIGRAM(S): at 18:01

## 2024-10-09 RX ADMIN — Medication 650 MILLIGRAM(S): at 21:57

## 2024-10-09 RX ADMIN — Medication 100 MILLILITER(S): at 20:15

## 2024-10-09 RX ADMIN — Medication 5 MILLIGRAM(S): at 07:16

## 2024-10-09 RX ADMIN — Medication 8.33 MILLILITER(S): at 18:01

## 2024-10-09 RX ADMIN — Medication 324 MILLIGRAM(S): at 14:19

## 2024-10-09 RX ADMIN — Medication 650 MILLIGRAM(S): at 23:28

## 2024-10-09 NOTE — PATIENT PROFILE ADULT - FUNCTIONAL ASSESSMENT - BASIC MOBILITY 6.
4-calculated by average/Not able to assess (calculate score using [facility] averaging method)

## 2024-10-09 NOTE — ED CDU PROVIDER SUBSEQUENT DAY NOTE - PROGRESS NOTE DETAILS
Patient comfortable, awaiting nuclear stress test Patient comfortable, awaiting nuclear stress test  Russian   #[id number] [place]  #[id number]  Patient advised  that stress test is +.  Agreeable to admission. Per cards tele, admit to Dr Manzo

## 2024-10-09 NOTE — PATIENT PROFILE ADULT - NSPROGENSOURCEINFO_GEN_A_NUR
[facility]  ADHD Evaluation     Client:  Oscar Serra  YOB: 1990  MRN: [id number]    Date(s) of assessment: Diagnostic Assessment (6/23/21; 6/30/21), Bertha self-report and collateral measures scored and interpreted (6/29/21), MMPI -2 (administered on 6/24/21, interpreted on 6/24/21)      Information about appointment:  Client attended three sessions to aid in determining client's mental health diagnosis or diagnoses and treatment recommendations that best address client concerns. Available medical records were reviewed. There were no previous psychological evaluations for review. A diagnostic assessment was conducted at the initial appointment. Client completed several rating scales to assist in assessing attention-related and other mental health symptoms that may be causing impairments in functioning. Rating scales were also completed by a collateral contact. Client also completed personality testing to aid in diagnostic clarification.     Assessment tools:   Bertha Adult ADHD Rating Scale-IV: Self and Other Reports (BAARS-IV), Bertha Functional Impairment Scale: Self and Other Reports (BFIS), Bertha Deficits in Executive Functioning Scale: Self and Other Reports (BDEFS), Patient Health Questionnaire-9 (PHQ-9), Generalized Anxiety Disorder-7 (JOHN PAUL-7) and [place] Multiphasic Personality Inventory (MMPI-2) *Testing administered remotely     Assessment Results:     Behavioral Observations:  Client arrived on time to each session. She was pleasant and cooperative at all times. She did not demonstrate difficulty with inattention or hyperactivity/impulsivity during sessions. The following results are likely to be an accurate reflection of Client's current functioning.     Bertha Adult ADHD Rating Scale-IV: Self and Other Reports (BAARS-IV)/  The BAARS-IV assesses for symptoms of ADHD that are experienced in one's daily life. This assessment measure includes self and collateral rating  "scales designed to provide information regarding current and childhood symptoms of ADHD including inattention, hyperactivity, and impulsivity. Self-report scores are reported as percentiles. Scores at the 76th-83rd percentile are considered marginal, scores at the 84th-92nd percentile are considered borderline, scores at the 93rd-95th percentile are considered mild, scores at the 96th-98th percentile are considered moderate, and those at the 99th percentile are considered severe. Collateral or \"other\" rating scales are reported as number of symptoms observed in comparison to those reported by the client. Norms and percentile scores are not available for collateral reports.      Current Symptoms Scale--Self Report:   Client completed the self-report inventory of current symptoms. The results indicate that the client's Total ADHD Score was 32 which places her in the 81st percentile for overall ADHD symptoms. In addition, the client endorsed the following occur \"often\" or \"very often\": 4/9 (95th percentile) Inattention symptoms, 0/9 (1-75th percentile) Hyperactivity/Impulsivity symptoms, and 6/9 (96th percentile) Sluggish Cognitive Tempo symptoms. Client indicated that the reported symptoms have resulted in impaired functioning in work and social relationships. Overall, the results suggest the client is reporting mild symptoms of inattention at this time.      Current Symptoms Scale--Other Report:  Client's  completed the collateral report inventory of current symptoms. Based on the collateral contact's observation of symptoms, the client demonstrates the following \"often\" or \"very often\": 1/9 Inattention symptoms, 0/5 Hyperactivity symptoms, 0/4 Impulsivity symptoms, and 4/9 Sluggish Cognitive Tempo symptoms. The client's Total ADHD Score was 22. The collateral contact indicated the client demonstrates impaired functioning in social relationships. The collateral- and self-report scores are discrepant; her " " did not note significant symptoms of ADHD at this time.      Childhood Symptoms Scale--Self-Report:  Client completed the self-report inventory of childhood symptoms. The results indicate that the client's Total ADHD Score was 20 which places her in the 1-50th percentile for overall ADHD symptoms in childhood. In addition, the client endorsed having experienced the following \"often\" or \"very often\": 0/9 (1-50th percentile) Inattention symptoms and 0/9 (1-50th percentile) Hyperactivity-Impulsivity symptoms. Overall, the results suggest the Client did not report experiencing symptoms of ADHD in childhood.      Childhood Symptoms Scale--Other Report:  Client's mother completed the collateral report inventory of childhood symptoms. Based on the collateral contact's recollection of client's childhood symptoms, the client demonstrated the following \"often\" or \"very often\": 0/9 Inattention symptoms and 0/9 Hyperactivity-Impulsivity symptoms. The client's Total ADHD Score was 18. The collateral-report and self-report scores are similar and suggest Client did not experience some symptoms of ADHD in childhood.       Bertha Functional Impairment Scale: Self and Other Reports (BFIS)  The BFIS is used to assess an individuals' psychosocial impairment in major life/daily activities that may be due to a mental health disorder. This assessment measure includes self and collateral rating scales. Self-report scores are reported as percentiles. Scores at the 76th-83rd percentile are considered marginal, scores at the 84th-92nd percentile are considered borderline, scores at the 93rd-95th percentile are considered mild, scores at the 96th-98th percentile are considered moderate, and those at the 99th percentile are considered severe. Collateral or \"other\" rating scales are reported as number of symptoms observed in comparison to those reported by the client. Norms and percentile scores are not available for collateral reports. " "     Results indicate the client identified impairment (scores at or greater than 93rd percentile) in the following area: work. The client's Mean Impairment Score was 1.5 (1-50th percentile) indicating the client is not reporting impairment in functioning across domains. Client's  completed the collateral rating scale, which indicated similar results (e.g., Mean Impairment Score of 1.15). Her  identified impairment in the area of: social-strangers.       Bertha Deficits in Executive Functioning Scale (BDEFS)  The BDEFS is a measure used for evaluating dimensions of adult executive functioning in daily life. This assessment measure includes self and collateral rating scales. Self-report scores are reported as percentiles. Scores at the 76th-83rd percentile are considered marginal, scores at the 84th-92nd percentile are considered borderline, scores at the 93rd-95th percentile are considered mild, scores at the 96th-98th percentile are considered moderate, and those at the 99th percentile are considered severe. Collateral or \"other\" rating scales are reported as number of symptoms observed in comparison to those reported by the client. Norms and percentile scores are not available for collateral reports.      Results indicate the client's Total Executive Functioning Score was 161 (51-75th percentile). The ADHD-Executive Functioning Index score was 19 (51-75th percentile). These scores suggest the client does not have deficits in executive functioning. These deficits may be due to ADHD or another mental health disorder. Results indicate the client identified significant deficits in the following area: self-organization/problem-solving (severe). Client's  completed the collateral rating scale, which indicated similar results. His scores were somewhat lower. He noted a deficit in the area of self-organization/problem-solving.         Summary of [place] Multiphasic Personality Inventory--Second " Edition   Client completed the [place] Multiphasic Personality Inventory-2 (MMPI-2), a self-report personality inventory, as part of her evaluation. Validity scales indicate that the client responded in an open and consistent manner, resulting in a valid profile. The following results are likely to be an accurate reflection of client's current functioning. Client s responses suggest that she is reporting moderate levels of general emotional distress. Individuals with similar profiles tend to generally see themselves as well adjusted and as sufficiently able to cope with the difficulties they face that they feel little or no need to draw attention to them. They are preoccupied with and concerned about their physical functioning or general health status. They have a tendency to develop physical symptoms in response to stress. They likely experience withdrawal, lack of energy, drive, interest, and motivation. They seek to avoid other people, both individuals and groups, because they feel uneasy and awkward in such situations and because they say they are happier being alone. They likely hold excessive generosity about the motives of others. They may experience a sense of mental failure or decline and the depletion of energy needed to accomplish mental work. Thinking and problem-solving are experienced as effortful and as subject to going off course even when significant effort is made. Thinking may be viewed as impaired or unreliable; they may have a sense that  I can t seem to get my mind right.      Generalized Anxiety Disorder Questionnaire (JOHN PAUL-7)  This questionnaire is designed to screen for anxiety in adults. Based on the client's score of 3, she is not reporting significant symptoms of anxiety at this time.      Patient Health Questionnaire- 9 (PHQ-9)   This questionnaire is designed to screen for depression in adults. Based on the client's score of 6, she is reporting mild symptoms of depression at this time.  "Symptoms endorsed include: little interest or pleasure in doing things; feeling tired or having little energy; poor concentration; and feeling fidgety/restless.    Summary (based on clinical interview, review of records, test results):  Client is a 30 year old, ,  female. Client was referred for a diagnostic assessment by PCP. The purpose of this evaluation is to: provide treatment recommendations and clarify diagnosis. Client's presenting concerns include: Client is having a hard time focusing. This has gotten worse since 2014 (when her mother was diagnosed with cancer). She has \"brain fog\" and forgets words \"at the last second.\" It is hard to understand new information. She can't concentrate and she \"tunes people out\" when they are talking to her. She has to try really hard to focus. She has had a lot of medical testing to rule out physical causes; she had an MRI last week to rule out issues. A neurologist recently determined her attention was poor and recommended testing for ADHD. It takes her longer to get things done now (things take twice as long to get done than it did in 2014). She used to be efficient and could multitask and now she is unable to do that and everything feels effortful. She has trouble focusing on one thing at a time. Her mind jumps from one thing to the next (this is especially bad when she is anxious or emotionally upset - e.g., feeling nervous/anxious, conflict with someone, waiting for a test result). She finds it difficult to follow instructions unless they are written down. She can't focus if there are loud noises going on (e.g., construction outside, someone is using machine tools, people talking loudly around her). She tends to feel \"overstimulated\" in large/busy place (e.g., grocery stores, board game places when there is a lot going on around her - this feels overwhelming).  Client stated that symptoms have resulted in the following functional impairments: health " "maintenance, management of the household and or completion of tasks and work / vocational responsibilities.        Client reported that she has not completed a previous ADHD diagnostic assessment.  Client has not received a previous diagnosis of ADHD.  Client reported that medication has not been prescribed medication to address these problems. Client reported that these problems began a few years ago. She stated, \"I never had these problems as a kid; they came on very suddenly.\" She feels she really started noticing this in 2014. She doesn't recall having these issues when she was younger. She stated, \"Could be the stress of everything going on with my mom's health and mind.\" She thinks these things have worsened as the years go by. She did not have chemotherapy or radiation for her cancer (\"just surgery\"). Client has not attempted to resolve these concerns in the past. Client reported that other professionals are not involved in providing support / services. There are indications or report of significant loss, trauma, abuse or neglect issues related to:  Her parents  when she was 14 years old; her mother was diagnosed with cancer in 2014; Client was diagnosed with cancer in 2015; Hashimoto s diagnosis in September 2020.    Patient reported she grew up in [place], [place].  She was raised by her biological parents. Her parents  when she was 14 years old. They lived with her mother most of the time and saw her father once per week and every other weekend. She was the first born of two children. She has a younger brother and a half-sister (who is 11 years older). Her half-sister struggles with mental health issues, so they are not close. Patient reported that their childhood was \"really good.\" They spent a lot of time together and she has fond memories.  Her father is an alcoholic. He didn't drink at home but he was always gone with his friends. They spent a lot of time with their mother who would feel " "angry when her father wasn't home. Client knows there was infidelity (\"I knew my dad wasn't faithful to my mom and I just always knew it and could sense it and that caused anxiety as a teen as well\"). Once she got older, her mother told Client and her brother. Client might have overheard them fighting or arguing at times. Patient described their current relationships with family of origin as close with parents and her brother. As a child, client reported that she did not have problems related to ADHD in childhood. Client reported no difficulty with childhood peer relationships.  As a child, client reported having regular and consistent sleep patterns.  Client reported currently experiencing regular and consistent sleep patterns.  Client reported sleeping approximately 7-8 hours per night. She might wake up feeling tired, but she sleeps well. Client reported that she has not completed a sleep study.     Client's highest education level was college graduate. Client graduated high school in 2006. She estimated she obtained mostly As and Bs. During the elementary, middle, and high school years, patient recalls academic strengths in the area of writing and English. Client reported experiencing academic problems in math and science. Client did not identify any learning problems. Client did not receive tutoring services during the school years. Client did not receive special education services. Client reported no particular problems during the school years. She doesn't recall struggling in school. She was able to focus and get her homework done on time. She was independent when it came to school work. Client did attend post-secondary school.  Client graduated from [employer] in 2011 with a degree in professional writing. She reported she did well and got good grades (mostly As and Bs) and turned assignments in on time. She was able to manage her time and study.     Client reported that she is currently employed " "part-time. She was working full-time as a  and cut back to part time due to attention issues. It has been hard for her to focus. 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 She noted that the topics she works on vary and it depends on the day. Client reported that the current job is a good fit for her skills and personality.  Client reported that she distractible behavior and poor time management (only recently) .  The client's work history includes:  work (out of college) - she always got good feedback from others and didn't struggle there. She also worked as a writer for a company for 6 years. The longest period of employment has been 6 years.  Client has not been terminated from a place of employment.       Results of testing were not indicative of ADHD. Rating scales suggest the Client is reporting mild symptoms of inattention at this time. However, the collateral report (her ) did not note current symptoms of inattention. Further, neither Client nor her mother reported significant symptoms of ADHD to be present in childhood. Rather, Client explained that she recalled these symptoms developing 6-7 years ago in 2014. Indeed, Client stated, \"I never had these problems as a kid; they came on very suddenly.\" Self-report measures suggest she is experiencing mild depression at this time. Personality testing was also significant for some anxiety and depression. Based on the results of clinical interview and psychological testing, the client currently meets criteria for a diagnosis of Adjustment Disorder with mixed anxiety and depressed mood. Client will be provided with the results of testing, diagnosis, and recommendations in her last appointment.    DSM5 Diagnoses: (Sustained by DSM5 Criteria Listed Above)    Adjustment Disorder with anxiety (F43.22)    Psychosocial & Contextual Factors: history of trauma (medical " issues)     Recommendations:    1. Schedule a medication evaluation with your physician/psychiatrist. Medications are often beneficial in treating anxiety and depression symptoms.     2. Individual therapy is recommended. Therapies focused on identifying and challenging problematic thought and behavior patterns while increasing the use of healthy coping skills has been found to be effective in treating anxiety and depression. It will be important to set goals in this therapy and work actively toward achieving short-term successes that lead to the completion of each goal. Action-oriented therapies, such as CBT and ACT are particularly recommended for the treatment of chronic anxiety and depression.    3. The use of behavioral strategies such as diaphragmatic breathing, guided imagery, and mindfulness is often helpful in the management of anxiety symptoms.    4. The following compensatory strategies may be useful to cope with reported inattention symptoms:   a. Maintaining a predictable routine and structured environment that incorporates prioritized checklists and reminders (e.g., Post-Its).  b. When completing tasks, try to focus on one task at a time and complete it in its entirety before moving on to the next task.  c. Minimize background distractions when working on complex tasks. For example, TV, radio or ongoing conversations in the background may hinder ability to focus on the task at hand.  d. Take regular breaks from tasks that require prolonged attention. In general, regular breaks from complex tasks can help prevent lapses in attention, which can result in errors.  e. Outline the steps required to complete a task prior to beginning it, which can help ensure an organized approach. Use the outline to refer to throughout the task as a reminder of the steps to be completed.       Morenita Maya, Ph.D.,   Licensed Psychologist      patient

## 2024-10-09 NOTE — ED CDU PROVIDER SUBSEQUENT DAY NOTE - CLINICAL SUMMARY MEDICAL DECISION MAKING FREE TEXT BOX
64-year-old male with a history of HTN none smoker presents with episodic chest pain for a month. pt had 2 sent trop and 2 none ischemic ecg, negative dissection study placed in EDOU for stress test.

## 2024-10-09 NOTE — H&P ADULT - NSHPPHYSICALEXAM_GEN_ALL_CORE
VITALS:   T(C): 36.6 (10-09-24 @ 07:40), Max: 36.7 (10-09-24 @ 01:05)  HR: 54 (10-09-24 @ 07:40) (52 - 71)  BP: 136/78 (10-09-24 @ 07:40) (109/61 - 175/92)  RR: 18 (10-09-24 @ 07:40) (18 - 18)  SpO2: 98% (10-09-24 @ 07:40) (94% - 99%)    GENERAL: NAD, lying in bed comfortably  HEAD:  Atraumatic, normocephalic  EYES: EOMI, PERRLA, conjunctiva and sclera clear  ENT: Moist mucous membranes  NECK: Supple, no JVD  HEART: Regular rate and rhythm, no murmurs, rubs, or gallops  LUNGS: Unlabored respirations.  Clear to auscultation bilaterally, no crackles, wheezing, or rhonchi  ABDOMEN: Soft, nontender, nondistended, +BS  EXTREMITIES: 2+ peripheral pulses bilaterally. No clubbing, cyanosis, or edema  NERVOUS SYSTEM:  A&Ox3, no focal deficits   SKIN: No rashes or lesions

## 2024-10-09 NOTE — H&P ADULT - NS_MD_PANP_GEN_ALL_CORE
Your glucose level, electrolyte level and kidney function, measured with a test called the basic metabolic panel, is normal, which is great news!     If you have any questions, please contact the clinic or schedule an appointment with me, thank you!    Sincerely,    BONILLA RHOADES MD   9/11/2017     
Attending and PA/NP shared services statement (NON-critical care):

## 2024-10-09 NOTE — ED CDU PROVIDER DISPOSITION NOTE - CLINICAL COURSE
64-year-old male with a history of HTN none smoker presents with episodic chest pain for a month. pt had 2 sent trop and 2 none ischemic ecg, negative dissection study placed in EDOU for stress test. Stress test was abnormal and pt admitted.

## 2024-10-09 NOTE — CHART NOTE - NSCHARTNOTEFT_GEN_A_CORE
PRE-OP DIAGNOSIS:  New onset angina, Abnormal stress test      PROCEDURE:     [X] Coronary Angiogram     [X] LHC     [] LVG     [] RHC     [X] Intervention (see below)         PHYSICIAN:  Dr. Vidal    ASSISTANT:  Dr. Horner       PROCEDURE DESCRIPTION:     Consent:      [X] Patient     [] Family Member     []  Used        Anesthesia:     [X] General     [] Sedation     [X] Local        Access & Closure:     [X] 6Fr R Radial Artery     [] Fr Femoral Artery     [] Fr Femoral Vein     [] Fr Brachial Vein       IV Contrast: 120 mL        Intervention:   IVUS  KRISTINA PCI    Implants:    SYNERGY 3.0 x 24mm to mLAD (AUC 8)    FINDINGS:     Coronary Dominance: Right dominate      LM: No significant disease    LAD: Mild disease Prox LAD, 99% subtotal stenosis with L to L collaterals of mLAD s/p PCI    CX: Mild disease prox Lcx     RCA: Mild disease mid RCA    LVEDP: 12 mmHg     ESTIMATED BLOOD LOSS: < 10 mL        CONDITION:     [X] Good     [] Fair     [] Critical        SPECIMEN REMOVED: N/A       POST-OP DIAGNOSIS:      [] Normal Coronary Angiogram     [] Mild Coronary Artery Disease (< 50% stenosis)     [X] 1 Vessel Coronary Artery Disease LAD s/p PCI       PLAN OF CARE:     [] D/C Home Today     [X] Return to In-patient bed     [] Admit for observation     [] Return for Staged Procedure     [] CT Surgery Consult     [X] Medications: DAPT, statin and BB    [X] IV Fluids: NS 150cc/hr x 6hrs

## 2024-10-09 NOTE — H&P ADULT - HISTORY OF PRESENT ILLNESS
Patient is a 64 year old male with PMHx ofHTN who presents to [facility] ED with complaints of chest pain. Patient reports pain has been occuring for the past few days and is described as a pressure in the center of his chest with radiation to the left arm. Pain is worsened with exertion and improved with rest. He denies any associated SOB, orthopnea, pnd, dizziness or syncope. In ED Vitals were stable. CXR revealed no infiltrates and troponin was negative x2. EKG showed SR with NSST changes. Nuclear stress test was performed and showed a large reversible perfusion defect involving the septum, inferior walls and apex with an LVEF 59%. Patient is being admitted to cardiac telemetry.  Mr. Davdi Newton is a 64 year old Armenian speaking (ID [id number] Maricel) male with PMHx of Pituitary adenoma (s/p transphenoidal resection of pituitary mass 2 years ago), Rheumatoid Arthritis, HLD, HTN who presented to [facility] ED with complaints of chest pain. Patient reports pain has been ongoing/ intermittent for 3-4 weeks, worsening over  past 1-2 days, described as substernal chest pressure radiating to left axilla, occurring mostly while at work. 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 He noticed during his commutes he begins to experience chest pain with associated shortness of breath. He denies any alleviating factors. He otherwise denies any associated LE  swelling, orthopnea, PND, dizziness or syncope. In ED Vitals were stable. CXR revealed no infiltrates and troponin was negative x2. EKG showed SR with NSST changes. Nuclear stress test was performed and showed a large reversible perfusion defect involving the septum, inferior walls and apex with an LVEF 59%. Patient is being admitted to cardiac telemetry for ischemic eval.

## 2024-10-09 NOTE — CHART NOTE - NSCHARTNOTEFT_GEN_A_CORE
Interventional Cardiology Radial band Removal Note    s/p Heparin 			s/p Angiomax    Pt without complaints.  VSS.    Pre removal exam  Right Radial access site TR Band in place, no hematoma, no bleed  Radial pulse: 2+    TR Band  2 CC removed at 23:30  2 CC removed at 23:35  2 CC removed at 23:40  2 CC removed at 23:45     Hemostasis achieved with manual release of band.      no  Vasovagal reaction.    Meds given:    Post removal exam  Right Radial access site  no hematoma, no bleed  Radial pulse: 2+    A/P:  s/p Dx Coronary Angiogram/FFR/IVUS/PTCA/Stent  -	continue to monitor  -	-OOB as tolerated  -	Post Procedure Instructions given  -                   Call [phone] if any access site issues. Interventional Cardiology Radial band Removal Note    s/p Heparin 			s/p Angiomax    Pt without complaints.  VSS.    Pre removal exam  Right Radial access site TR Band in place, no hematoma, no bleed  Radial pulse: 2+    TR Band  4 CC removed at 23:30  3 CC removed at 23:35  4 CC removed at 23:40  4 CC removed at 23:45     bleeding occurred after 23:45 TR band reinflated    4cc removed at 00:00  4cc removed at 00:05  4cc removed at 00:10    Hemostasis achieved with manual release of band.      no  Vasovagal reaction.    Meds given:    Post removal exam  Right Radial access site  no hematoma, no bleed  Radial pulse: 2+    A/P:  s/p Dx Coronary Angiogram/FFR/IVUS/PTCA/Stent  -	continue to monitor  -	-OOB as tolerated  -	Post Procedure Instructions given  -                   Call [phone] if any access site issues.

## 2024-10-09 NOTE — H&P ADULT - NSICDXPASTMEDICALHX_GEN_ALL_CORE_FT
PAST MEDICAL HISTORY:  Arthritis     HTN (hypertension)     Seasonal allergies      PAST MEDICAL HISTORY:  Arthritis     HLD (hyperlipidemia)     HTN (hypertension)     Seasonal allergies

## 2024-10-09 NOTE — CHART NOTE - NSCHARTNOTEFT_GEN_A_CORE
PREOPERATIVE DAY OF PROCEDURE EVALUATION:  I have personally seen and examined the patient.  I agree with the history and physical which I have reviewed and noted any changes below.  (Signed electronically by __________)  10-09-24 @ 17:58    64 year old Somali speaking (ID [id number] Maricel) male with PMHx of Pituitary adenoma (s/p transphenoidal resection of pituitary mass 2 years ago), Rheumatoid Arthritis, HLD, HTN who presented to [facility] ED with complaints of chest pain. Patient reports pain has been ongoing/ intermittent for 3-4 weeks, worsening over  past 1-2 days, described as substernal chest pressure radiating to left axilla, occurring mostly while at work. 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 He noticed during his commutes he begins to experience chest pain with associated shortness of breath. He denies any alleviating factors. He otherwise denies any associated LE  swelling, orthopnea, PND, dizziness or syncope. In ED Vitals were stable. CXR revealed no infiltrates and troponin was negative x2. EKG showed SR with NSST changes. Nuclear stress test was performed and showed a large reversible perfusion defect involving the septum, inferior walls and apex with an LVEF 59%. Patient presents to cardiology department for LHC with possible intervention    < from: NM Nuclear Stress Pharmacologic Multiple (10.09.24 @ 08:35) >    Impression:  1.   Large reversible defect in the septum, inferior wall and apex of the   left ventricle consistent with ischemia.  2.  Normal left ventricular wall motion and wall thickening.  3.  Left ventricular ejection fraction calculated as 59% which is within   range of normal      Cath Bleeding Risk: 1.3%    Prehydration:                             ( x)  ml bolus x 1 hr prior to cardiac cath followed by                           ( x) 75cc/hr until procedure                           ( ) 50cc/hr until procedure                          ( ) no fluids d/t    Antianginals: CCB, BB                      Sanket test: positive    Plavix 600mg x 1 for pre cath

## 2024-10-09 NOTE — H&P ADULT - ASSESSMENT
Patient is a 64 year old male who presented with exertional chest pain and underwent nuclear stress test which was abnormal.    # Typical Angina  No ACS  Abnormal Nuclear Stress test  -Check Fasting Lipids  -Check HgbA1C  -Check TSH  -Add ASA 81mg PO Daily  -Add Toprol XL 25mg PO Daily  -Add Amlodpine 2.5mg PO daily  -Add Lipitor 40mg PO QHS  -Check TTE  -Start IVF  -For LHC today Assessment Mr. David Newton is a 64 year old male PMHx of Pituitary adenoma (s/p transphenoidal resection of pituitary mass 2 years ago), Rheumatoid Arthritis, HLD, HTN who presented with exertional chest pain and underwent nuclear stress test which was abnormal.    Problems discussed and associated plan:   # Typical Angina  No ACS  Abnormal Nuclear Stress test  -admit to cards tele/ monitor on telemetry   -Check Fasting Lipids  -Check HgbA1C  -Check TSH  -c/w ASA 81mg PO Daily, will load with ASA 324mg x1   -c/w Toprol XL 12.5mg PO Daily  -c/w Amlodipine 2.5mg PO daily  -increase Lipitor to 40mg PO QHS, i/so CAD  -Check TTE to evaluate LV function   -f/u AM ECG  -Start IVF per protocol   -Plan for ischemic wkup with [facility] today    #HTN  -continue Amlodipine 2.5mg   -continue Toprol 12.5mg     #HLD  -f/u lipid panel  -increase to high intensity statin    #Pituitary adenoma s/p resection of mass  -monitor BPs     #Misc   DVT Heparin SQ post cath  FULL CODE Assessment Mr. David Newton is a 64 year old male PMHx of Pituitary adenoma (s/p transphenoidal resection of pituitary mass 2 years ago), Rheumatoid Arthritis, HLD, HTN who presented with exertional chest pain and underwent nuclear stress test which was abnormal. Now admitted to cards tele for further management.     Problems discussed and associated plan:   # Typical Angina  No ACS  Abnormal Nuclear Stress test  -admit to cards tele/ monitor on telemetry   -Check Fasting Lipids  -Check HgbA1C  -Check TSH  -c/w ASA 81mg PO Daily, will load with ASA 324mg x1   -c/w Toprol XL 12.5mg PO Daily  -c/w Amlodipine 2.5mg PO daily  -increase Lipitor to 40mg PO QHS, i/so CAD  -Check TTE to evaluate LV function   -f/u AM ECG  -Start IVF per protocol   -Plan for ischemic wkup with [facility] today    #HTN  -continue Amlodipine 2.5mg   -continue Toprol 12.5mg     #HLD  -f/u lipid panel  -increase to high intensity statin    #Pituitary adenoma s/p resection of mass  -monitor BPs     #Misc   DVT Heparin SQ post cath  FULL CODE

## 2024-10-09 NOTE — ED CDU PROVIDER DISPOSITION NOTE - ATTENDING CONTRIBUTION TO CARE
64-year-old male with a history of HTN none smoker presents with episodic chest pain for a month. pt had 2 sent trop and 2 none ischemic ecg, negative dissection study placed in EDOU for stress test.     Used Belgian  ID: [id number]

## 2024-10-09 NOTE — PATIENT PROFILE ADULT - NSPROSPHOSPCHAPLAINYN_GEN_A_NUR
Subjective   Patient ID: Ramya Armenta is a 1 y.o. female who presents for Fever (FEELS HOT).  Today she is accompanied by mom and dad, who serves as an independent historian.     Recently seen for sinusitis  Treated with amox  Not better  Now seems worse  Nose is more congested  Waking up at night crying  Feels worse      Objective   Temp 36.7 °C (98 °F)   Wt 13.9 kg   BSA: There is no height or weight on file to calculate BSA.  Growth percentiles: No height on file for this encounter. 54 %ile (Z= 0.10) based on CDC (Girls, 2-20 Years) weight-for-age data using vitals from 12/23/2023.     Physical Exam  Constitutional:       General: She is active. She is not in acute distress.     Appearance: She is not toxic-appearing.   HENT:      Head: Normocephalic and atraumatic.      Right Ear: Tympanic membrane normal.      Left Ear: Tympanic membrane normal.      Nose: Nose normal.      Mouth/Throat:      Mouth: Mucous membranes are moist.      Pharynx: Oropharynx is clear. No posterior oropharyngeal erythema.   Eyes:      Conjunctiva/sclera: Conjunctivae normal.      Pupils: Pupils are equal, round, and reactive to light.   Cardiovascular:      Rate and Rhythm: Normal rate and regular rhythm.      Pulses: Normal pulses.      Heart sounds: Normal heart sounds.   Pulmonary:      Effort: Pulmonary effort is normal.      Breath sounds: Normal breath sounds.   Abdominal:      General: Abdomen is flat.      Palpations: Abdomen is soft.               Assessment/Plan   1 y.o., otherwise healthy female presenting with chronic congestion consistent with acute sinusitis. Will treat with 10 day course of augmentin. Discussed supportive care including adequate hydration and symptom control. Please call if there is no improvement in 3-4 days or earlier if there are any concerns.       Problem List Items Addressed This Visit    None      Stephanie Garcia MD     
no

## 2024-10-09 NOTE — H&P ADULT - NS ATTEND AMEND GEN_ALL_CORE FT
Agree with assessment and plan above, unless otherwise documented in my attestation.    Mr Aman is a 65yo with PMHx of HTN, HLD, RA who presented with chest pain. He underwent NST, which was abnormal  -Admit to 4T  -Plan for C today  -TTE  -Lipids, A1c  -ASA/statin

## 2024-10-09 NOTE — H&P ADULT - NSHPLABSRESULTS_GEN_ALL_CORE
13.8   5.82  )-----------( 283      ( 08 Oct 2024 19:55 )             42.1   10-08    141  |  107  |  19  ----------------------------<  89  4.7   |  26  |  1.3    Ca    9.1      08 Oct 2024 19:55  Mg     2.2     10-08    TPro  6.8  /  Alb  4.4  /  TBili  <0.2  /  DBili  x   /  AST  19  /  ALT  14  /  AlkPhos  73  10-08 ECG: 10/9/24 SR 60 BPM   CXR: 10/9/24    Low lung volume.  No acute infiltrates.  TTE: Pending   Stress Test 10/9/24: Impression:  1.   Large reversible defect in the septum, inferior wall and apex of the   left ventricle consistent with ischemia.  2.  Normal left ventricular wall motion and wall thickening.  3.  Left ventricular ejection fraction calculated as 59% which is within   range of normal  CT Head: IMPRESSION:  No acute intracranial pathology.  Redemonstrated postsurgical changes reflecting transsphenoidal resection   of pituitary mass.  CTA-AP 10/8/24  No evidence of acute aortic, acute thoracic or acute abdominal pathology.    Labs:    13.8   5.82  )-----------( 283      ( 08 Oct 2024 19:55 )             42.1   10-08    141  |  107  |  19  ----------------------------<  89  4.7   |  26  |  1.3    Ca    9.1      08 Oct 2024 19:55  Mg     2.2     10-08    TPro  6.8  /  Alb  4.4  /  TBili  <0.2  /  DBili  x   /  AST  19  /  ALT  14  /  AlkPhos  73  10-08

## 2024-10-09 NOTE — H&P ADULT - TIME BILLING
Interviewed and examined patient. Reviewed hospital course, diagnostic testing (NST, TTE, LHC), tele, lab work, and medications. Discussed plan with patient.

## 2024-10-10 ENCOUNTER — TRANSCRIPTION ENCOUNTER (OUTPATIENT)
Age: 64
End: 2024-10-10

## 2024-10-10 LAB
A1C WITH ESTIMATED AVERAGE GLUCOSE RESULT: 6.2 % — HIGH (ref 4–5.6)
CHOLEST SERPL-MCNC: 195 MG/DL — SIGNIFICANT CHANGE UP
ESTIMATED AVERAGE GLUCOSE: 131 MG/DL — HIGH (ref 68–114)
HCT VFR BLD CALC: 40 % — LOW (ref 42–52)
HDLC SERPL-MCNC: 46 MG/DL — SIGNIFICANT CHANGE UP
HGB BLD-MCNC: 13.2 G/DL — LOW (ref 14–18)
LIPID PNL WITH DIRECT LDL SERPL: 125 MG/DL — HIGH
MCHC RBC-ENTMCNC: 29.9 PG — SIGNIFICANT CHANGE UP (ref 27–31)
MCHC RBC-ENTMCNC: 33 G/DL — SIGNIFICANT CHANGE UP (ref 32–37)
MCV RBC AUTO: 90.7 FL — SIGNIFICANT CHANGE UP (ref 80–94)
NON HDL CHOLESTEROL: 149 MG/DL — HIGH
NRBC # BLD: 0 /100 WBCS — SIGNIFICANT CHANGE UP (ref 0–0)
PLATELET # BLD AUTO: 238 K/UL — SIGNIFICANT CHANGE UP (ref 130–400)
PMV BLD: 9.4 FL — SIGNIFICANT CHANGE UP (ref 7.4–10.4)
RBC # BLD: 4.41 M/UL — LOW (ref 4.7–6.1)
RBC # FLD: 12.9 % — SIGNIFICANT CHANGE UP (ref 11.5–14.5)
TRIGL SERPL-MCNC: 119 MG/DL — SIGNIFICANT CHANGE UP
TSH SERPL-MCNC: 1.26 UIU/ML — SIGNIFICANT CHANGE UP (ref 0.27–4.2)
WBC # BLD: 6.95 K/UL — SIGNIFICANT CHANGE UP (ref 4.8–10.8)
WBC # FLD AUTO: 6.95 K/UL — SIGNIFICANT CHANGE UP (ref 4.8–10.8)

## 2024-10-10 PROCEDURE — 99232 SBSQ HOSP IP/OBS MODERATE 35: CPT

## 2024-10-10 PROCEDURE — 93010 ELECTROCARDIOGRAM REPORT: CPT

## 2024-10-10 RX ORDER — AMLODIPINE BESYLATE 5 MG
2.5 TABLET ORAL ONCE
Refills: 0 | Status: COMPLETED | OUTPATIENT
Start: 2024-10-10 | End: 2024-10-10

## 2024-10-10 RX ORDER — FAMOTIDINE 40 MG
20 TABLET ORAL ONCE
Refills: 0 | Status: COMPLETED | OUTPATIENT
Start: 2024-10-10 | End: 2024-10-10

## 2024-10-10 RX ORDER — AMLODIPINE BESYLATE 5 MG
5 TABLET ORAL DAILY
Refills: 0 | Status: DISCONTINUED | OUTPATIENT
Start: 2024-10-11 | End: 2024-10-11

## 2024-10-10 RX ORDER — PANTOPRAZOLE SODIUM 40 MG/1
40 TABLET, DELAYED RELEASE ORAL DAILY
Refills: 0 | Status: DISCONTINUED | OUTPATIENT
Start: 2024-10-10 | End: 2024-10-11

## 2024-10-10 RX ADMIN — Medication 650 MILLIGRAM(S): at 10:33

## 2024-10-10 RX ADMIN — Medication 650 MILLIGRAM(S): at 22:07

## 2024-10-10 RX ADMIN — Medication 2.5 MILLIGRAM(S): at 12:30

## 2024-10-10 RX ADMIN — Medication 650 MILLIGRAM(S): at 23:07

## 2024-10-10 RX ADMIN — Medication 20 MILLIGRAM(S): at 08:02

## 2024-10-10 RX ADMIN — Medication 81 MILLIGRAM(S): at 11:07

## 2024-10-10 RX ADMIN — PANTOPRAZOLE SODIUM 40 MILLIGRAM(S): 40 TABLET, DELAYED RELEASE ORAL at 11:08

## 2024-10-10 RX ADMIN — ATORVASTATIN CALCIUM 40 MILLIGRAM(S): 10 TABLET, FILM COATED ORAL at 21:21

## 2024-10-10 RX ADMIN — Medication 12.5 MILLIGRAM(S): at 09:06

## 2024-10-10 RX ADMIN — Medication 2.5 MILLIGRAM(S): at 09:06

## 2024-10-10 RX ADMIN — Medication 650 MILLIGRAM(S): at 09:07

## 2024-10-10 RX ADMIN — Medication 75 MILLIGRAM(S): at 11:08

## 2024-10-10 NOTE — PROGRESS NOTE ADULT - NS ATTEND AMEND GEN_ALL_CORE FT
S/p PCI to mLAD yesterday. Patient with 5/10 chest pain. ECG with NSR. I reviewed the [facility] images and suspect distal embolization causing anginal symptoms. This was discussed with the patient and his wife, plan to stay overnight for further monitoring. Stop Toprol as there are no residual severe lesions and patient with bradycardia. Increase amlodipine to 5 mg daily.

## 2024-10-10 NOTE — PROGRESS NOTE ADULT - ASSESSMENT
Assessment Mr. David Newton is a 64 year old male PMHx of Pituitary adenoma (s/p transphenoidal resection of pituitary mass 2 years ago), Rheumatoid Arthritis, HLD, HTN who presented with exertional chest pain and underwent nuclear stress test which was abnormal. Now admitted to [facility] for further management.     Problems discussed and associated plan:   # Typical Angina  No ACS  Abnormal Nuclear Stress test  s/p LHC stent to mLAD ASA/Plavix  -c/w ASA 81mg PO Daily  -dc Toprol XL 12.5mg PO Daily  -increase Amlodipine to 5mg PO daily  -c/w Lipitor to 40mg PO QHS, i/so CAD  -TTE: EF of 59 %, Mild MR     #HTN  -increase Amlodipine 5mg   -d/c Toprol 12.5mg     #HLD  -c/w high intensity statin    #Pituitary adenoma s/p resection of mass  -monitor BPs     #Misc   DVT Heparin SQ post cath  FULL CODE    Please contact me with any questions or concerns at [phone]. Assessment Mr. David Newton is a 64 year old male PMHx of Pituitary adenoma (s/p transphenoidal resection of pituitary mass 2 years ago), Rheumatoid Arthritis, HLD, HTN who presented with exertional chest pain and underwent nuclear stress test which was abnormal. Now admitted to [facility] for further management.     Problems discussed and associated plan:   # Typical Angina  No ACS  Abnormal Nuclear Stress test  s/p LHC stent to mLAD c/w  ASA/Plavix  -dc Toprol XL 12.5mg PO Daily  -increase Amlodipine to 5mg PO daily  -c/w Lipitor to 40mg PO QHS, i/so CAD  -TTE: EF of 59 %, Mild MR     #HTN  -increase Amlodipine 5mg   -d/c Toprol 12.5mg     #HLD  -c/w high intensity statin    #Pituitary adenoma s/p resection of mass  -monitor BPs     #Misc   DVT Heparin SQ post cath  FULL CODE    Please contact me with any questions or concerns at [phone].

## 2024-10-10 NOTE — DISCHARGE NOTE PROVIDER - NSDCCPTREATMENT_GEN_ALL_CORE_FT
PRINCIPAL PROCEDURE  Procedure: Left heart catheterization  Findings and Treatment: - Please take aspirin 81 mg daily and plavix, unless directed by your Cardiologist.  - Do not drive or operate heavy machinery for 24 hours.  - After 24 hours, you may shower and remove the dressing from the site.  - Avoid using affected arm for 24 hours.  - No heavy lifting (objects more than 5 lbs) for 1 week.  - Do not bathe or swim for 1 week.   - Do not rub or apply lotion, cream, or powder to the affected site. Leave it open to the air.   - Any sudden swelling, redness, fever, discharge, or severe pain, call your Cardiologist or call the Catheterization Lab at [phone].  - If there is bleeding from the puncture site, apply direct firm pressure on the site and call [phone].

## 2024-10-10 NOTE — DISCHARGE NOTE PROVIDER - NSDCMRMEDTOKEN_GEN_ALL_CORE_FT
amLODIPine 2.5 mg oral tablet: 1 tab(s) orally once a day  aspirin 81 mg oral tablet, chewable: 1 tab(s) chewed once a day  atorvastatin 10 mg oral tablet: 1 tab(s) orally once a day (at bedtime)  ibuprofen 200 mg oral tablet: 1 tab(s) orally once a day as needed for  moderate pain  Toprol-XL 25 mg oral tablet, extended release: 0.5 tab(s) orally once a day   amLODIPine 5 mg oral tablet: 1 tab(s) orally once a day  aspirin 81 mg oral delayed release tablet: 1 tab(s) orally once a day  atorvastatin 40 mg oral tablet: 1 tab(s) orally once a day (at bedtime)  clopidogrel 75 mg oral tablet: 1 tab(s) orally once a day  ibuprofen 200 mg oral tablet: 1 tab(s) orally once a day as needed for  moderate pain  pantoprazole 40 mg oral granule, delayed release: 40 milligram(s) orally once a day   amLODIPine 5 mg oral tablet: 1 tab(s) orally once a day  aspirin 81 mg oral delayed release tablet: 1 tab(s) orally once a day  atorvastatin 40 mg oral tablet: 1 tab(s) orally once a day (at bedtime)  clopidogrel 75 mg oral tablet: 1 tab(s) orally once a day  pantoprazole 40 mg oral granule, delayed release: 40 milligram(s) orally once a day

## 2024-10-10 NOTE — DISCHARGE NOTE PROVIDER - HOSPITAL COURSE
Mr. David Newton is a 64 year old Turkmen speaking (ID [id number] Maricel) male with PMHx of Pituitary adenoma (s/p transphenoidal resection of pituitary mass 2 years ago), Rheumatoid Arthritis, HLD, HTN who presented to [facility] ED with complaints of chest pain. Patient reports pain has been ongoing/ intermittent for 3-4 weeks, worsening over  past 1-2 days, described as substernal chest pressure radiating to left axilla, occurring mostly while at work. 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 He noticed during his commutes he begins to experience chest pain with associated shortness of breath. He denies any alleviating factors. He otherwise denies any associated LE  swelling, orthopnea, PND, dizziness or syncope. In ED Vitals were stable. CXR revealed no infiltrates and troponin was negative x2. EKG showed SR with NSST changes. Nuclear stress test was performed and showed a large reversible perfusion defect involving the septum, inferior walls and apex with an LVEF 59%. Patient was admitted to cardiac telemetry and taken for LHC on 10/9/24. On 10/9/24 patient undernoted LHC which revealed    Implants:    SYNERGY 3.0 x 24mm to mLAD (AUC 8)  FINDINGS:   Coronary Dominance: Right dominate  LM: No significant disease  LAD: Mild disease Prox LAD, 99% subtotal stenosis with L to L collaterals of mLAD s/p PCI  CX: Mild disease prox Lcx   RCA: Mild disease mid RCA  LVEDP: 12 mmHg     Patient was monitored overnight. On POD 1 patient remains HD stable with no complaints. Patient remains in SR with no arrhythmias noted on tele. EKG performed showed no acute ST changes. Examination of right radial artery showed a C/DI site with no hematoma, erythema or bruit. Distal pulses are 2+ bilaterally. Renal function remains stable post cath. Patient will be discharged home on DAPT with ASA and Plavix.  Patient is being DC home in stable condition.

## 2024-10-10 NOTE — DISCHARGE NOTE PROVIDER - CARE PROVIDERS DIRECT ADDRESSES
[email or web address]

## 2024-10-10 NOTE — PROGRESS NOTE ADULT - SUBJECTIVE AND OBJECTIVE BOX
Chief complaint: Patient is a 64y old  Male who presents with a chief complaint of chest pain (10 Oct 2024 08:39)    Interval history: NAD. R-Radial stable. Patient c/o 4-5 dull chest pain, worse with walking, ECG no changes.Per patient, would like to stay 1 more day for further monitoring.     Review of systems: A complete 10-point review of systems was obtained and is negative except as stated in the interval history.    Vitals:  T(F): 98.8, Max: 98.8 (10-10 @ 07:22)  HR: 62 (48 - 69)  BP: 154/83 (124/74 - 170/86)  RR: 20 (13 - 25)  SpO2: 98% (97% - 99%)    Ins & outs:     10-09 @ 07:01  -  10-10 @ 07:00  --------------------------------------------------------  IN: 0 mL / OUT: 650 mL / NET: -650 mL      Weight trend:  Weight (kg): 58 (10-09)    Physical exam:  General: No apparent distress  HEENT: Anicteric sclera. Moist mucous membranes. .   Cardiac: Regular rate and rhythm. No murmurs, rubs, or gallops.   Vascular: Symmetric radial pulses. Dorsalis pedis pulses palpable.   Respiratory: Normal effort. Clear to ascultation.   Abdomen: Soft, nontender. Audible bowel sounds.   Extremities: Warm with no edema. No cyanosis or clubbing.   Skin: Warm and dry. No rash.   Neurologic: Grossly normal motor function.   Psychiatric: Oriented to person, place, and time.     Data reviewed:  - Telemetry:  50-71     - Labs:                        13.2   6.95  )-----------( 238      ( 10 Oct 2024 06:10 )             40.0     10-09    137  |  107  |  15  ----------------------------<  85  3.6   |  20  |  0.9    Ca    7.5[L]      09 Oct 2024 21:14  Mg     2.2     10-08    TPro  6.8  /  Alb  4.4  /  TBili  <0.2  /  DBili  x   /  AST  19  /  ALT  14  /  AlkPhos  73  10-08    Triglycerides, Serum: 119 mg/dL (10-10-24 @ 06:10)  LDL Cholesterol Calculated: 125 mg/dL (10-10-24 @ 06:10)      Urinalysis Basic - ( 09 Oct 2024 21:14 )    Color: x / Appearance: x / SG: x / pH: x  Gluc: 85 mg/dL / Ketone: x  / Bili: x / Urobili: x   Blood: x / Protein: x / Nitrite: x   Leuk Esterase: x / RBC: x / WBC x   Sq Epi: x / Non Sq Epi: x / Bacteria: x      Medications:  MEDICATIONS  (STANDING):  amLODIPine   Tablet 5 milliGRAM(s) Oral daily  aspirin enteric coated 81 milliGRAM(s) Oral daily  atorvastatin 40 milliGRAM(s) Oral at bedtime  clopidogrel Tablet 75 milliGRAM(s) Oral daily  pantoprazole   Suspension 40 milliGRAM(s) Oral daily  regadenoson Injectable 0.4 milliGRAM(s) IV Push once    MEDICATIONS  (PRN):  acetaminophen     Tablet .. 650 milliGRAM(s) Oral every 6 hours PRN Mild Pain (1 - 3)

## 2024-10-10 NOTE — DISCHARGE NOTE PROVIDER - CARE PROVIDER_API CALL
ASP - Therapy recommended/ Alternative therapy Kapil Vidal  Interventional Cardiology  [address] [place] [address], [address]  [place] [address]  Phone: [phone]  Fax: [phone]  Follow Up Time: 1 week

## 2024-10-11 ENCOUNTER — TRANSCRIPTION ENCOUNTER (OUTPATIENT)
Age: 64
End: 2024-10-11

## 2024-10-11 VITALS — SYSTOLIC BLOOD PRESSURE: 120 MMHG | HEART RATE: 64 BPM | RESPIRATION RATE: 20 BRPM | DIASTOLIC BLOOD PRESSURE: 70 MMHG

## 2024-10-11 PROBLEM — E78.5 HYPERLIPIDEMIA, UNSPECIFIED: Chronic | Status: ACTIVE | Noted: 2024-10-09

## 2024-10-11 PROCEDURE — 99239 HOSP IP/OBS DSCHRG MGMT >30: CPT

## 2024-10-11 PROCEDURE — 93010 ELECTROCARDIOGRAM REPORT: CPT

## 2024-10-11 RX ORDER — ASPIRIN 325 MG
1 TABLET ORAL
Refills: 0 | DISCHARGE

## 2024-10-11 RX ORDER — ATORVASTATIN CALCIUM 10 MG/1
1 TABLET, FILM COATED ORAL
Refills: 0 | DISCHARGE

## 2024-10-11 RX ORDER — ASPIRIN 325 MG
1 TABLET ORAL
Qty: 30 | Refills: 3
Start: 2024-10-11 | End: 2025-02-07

## 2024-10-11 RX ORDER — AMLODIPINE BESYLATE 5 MG
1 TABLET ORAL
Qty: 30 | Refills: 3
Start: 2024-10-11 | End: 2025-02-07

## 2024-10-11 RX ORDER — AMLODIPINE BESYLATE 5 MG
1 TABLET ORAL
Refills: 0 | DISCHARGE

## 2024-10-11 RX ORDER — ATORVASTATIN CALCIUM 10 MG/1
1 TABLET, FILM COATED ORAL
Qty: 30 | Refills: 3
Start: 2024-10-11 | End: 2025-02-07

## 2024-10-11 RX ORDER — METOPROLOL TARTRATE 50 MG
0.5 TABLET ORAL
Refills: 0 | DISCHARGE

## 2024-10-11 RX ORDER — PANTOPRAZOLE SODIUM 40 MG/1
40 TABLET, DELAYED RELEASE ORAL
Qty: 30 | Refills: 3
Start: 2024-10-11 | End: 2025-02-07

## 2024-10-11 RX ADMIN — Medication 81 MILLIGRAM(S): at 11:00

## 2024-10-11 RX ADMIN — Medication 75 MILLIGRAM(S): at 11:00

## 2024-10-11 RX ADMIN — PANTOPRAZOLE SODIUM 40 MILLIGRAM(S): 40 TABLET, DELAYED RELEASE ORAL at 11:00

## 2024-10-11 NOTE — DISCHARGE NOTE NURSING/CASE MANAGEMENT/SOCIAL WORK - NSDCPEFALRISK_GEN_ALL_CORE
For information on Fall & Injury Prevention, visit: [email or web address] OR  [email or web address] OR  https://www.cdc.gov/steadi/patient.html

## 2024-10-11 NOTE — DISCHARGE NOTE NURSING/CASE MANAGEMENT/SOCIAL WORK - NSDCVIVACCINE_GEN_ALL_CORE_FT
Tdap; 16-Mar-2020 17:47; Ibeth Currie (ALEJANDRA); [employer]; [id number] (Exp. Date: 19-Mar-2022); IntraMuscular; Deltoid Right.; 0.5 milliLiter(s); VIS (VIS Published: 09-May-2013, VIS Presented: 16-Mar-2020);

## 2024-10-11 NOTE — DISCHARGE NOTE NURSING/CASE MANAGEMENT/SOCIAL WORK - PATIENT PORTAL LINK FT
You can access the FollowMyHealth Patient Portal offered by [facility] by registering at the following website: [email or web address]. By joining [facility]’s FollowMyHealth portal, you will also be able to view your health information using other applications (apps) compatible with our system.

## 2024-10-14 ENCOUNTER — NON-APPOINTMENT (OUTPATIENT)
Age: 64
End: 2024-10-14

## 2024-10-15 DIAGNOSIS — Z98.890 OTHER SPECIFIED POSTPROCEDURAL STATES: ICD-10-CM

## 2024-10-15 DIAGNOSIS — I25.110 ATHEROSCLEROTIC HEART DISEASE OF NATIVE CORONARY ARTERY WITH UNSTABLE ANGINA PECTORIS: ICD-10-CM

## 2024-10-15 DIAGNOSIS — Z79.82 LONG TERM (CURRENT) USE OF ASPIRIN: ICD-10-CM

## 2024-10-15 DIAGNOSIS — E78.5 HYPERLIPIDEMIA, UNSPECIFIED: ICD-10-CM

## 2024-10-15 DIAGNOSIS — J30.2 OTHER SEASONAL ALLERGIC RHINITIS: ICD-10-CM

## 2024-10-15 DIAGNOSIS — M06.9 RHEUMATOID ARTHRITIS, UNSPECIFIED: ICD-10-CM

## 2024-10-15 DIAGNOSIS — I10 ESSENTIAL (PRIMARY) HYPERTENSION: ICD-10-CM

## 2024-10-18 ENCOUNTER — APPOINTMENT (OUTPATIENT)
Dept: CARDIOLOGY | Facility: CLINIC | Age: 64
End: 2024-10-18
Payer: MEDICARE

## 2024-10-18 VITALS
BODY MASS INDEX: 21.51 KG/M2 | WEIGHT: 126 LBS | HEIGHT: 64 IN | SYSTOLIC BLOOD PRESSURE: 130 MMHG | DIASTOLIC BLOOD PRESSURE: 86 MMHG | HEART RATE: 65 BPM

## 2024-10-18 DIAGNOSIS — K27.9 PEPTIC ULCER, SITE UNSPECIFIED, UNSPECIFIED AS ACUTE OR CHRONIC, W/OUT HEMORRHAGE OR PERFORATION: ICD-10-CM

## 2024-10-18 DIAGNOSIS — Z95.5 PRESENCE OF CORONARY ANGIOPLASTY IMPLANT AND GRAFT: ICD-10-CM

## 2024-10-18 DIAGNOSIS — D35.2 BENIGN NEOPLASM OF PITUITARY GLAND: ICD-10-CM

## 2024-10-18 DIAGNOSIS — E78.5 HYPERLIPIDEMIA, UNSPECIFIED: ICD-10-CM

## 2024-10-18 DIAGNOSIS — I25.10 ATHEROSCLEROTIC HEART DISEASE OF NATIVE CORONARY ARTERY W/OUT ANGINA PECTORIS: ICD-10-CM

## 2024-10-18 DIAGNOSIS — I01.1 ACUTE RHEUMATIC ENDOCARDITIS: ICD-10-CM

## 2024-10-18 DIAGNOSIS — K25.9 GASTRIC ULCER, UNSPECIFIED AS ACUTE OR CHRONIC, W/OUT HEMORRHAGE OR PERFORATION: ICD-10-CM

## 2024-10-18 DIAGNOSIS — M06.9 RHEUMATOID ARTHRITIS, UNSPECIFIED: ICD-10-CM

## 2024-10-18 DIAGNOSIS — Z80.9 FAMILY HISTORY OF MALIGNANT NEOPLASM, UNSPECIFIED: ICD-10-CM

## 2024-10-18 DIAGNOSIS — R73.03 PREDIABETES.: ICD-10-CM

## 2024-10-18 PROCEDURE — 93000 ELECTROCARDIOGRAM COMPLETE: CPT

## 2024-10-18 PROCEDURE — 99214 OFFICE O/P EST MOD 30 MIN: CPT

## 2024-10-18 RX ORDER — CLOPIDOGREL BISULFATE 75 MG/1
75 TABLET, FILM COATED ORAL
Refills: 0 | Status: DISCONTINUED | COMMUNITY
End: 2024-10-18

## 2024-10-18 RX ORDER — PANTOPRAZOLE 40 MG/1
40 TABLET, DELAYED RELEASE ORAL DAILY
Qty: 90 | Refills: 3 | Status: ACTIVE | COMMUNITY
Start: 1900-01-01 | End: 1900-01-01

## 2024-10-18 RX ORDER — TICAGRELOR 90 MG/1
90 TABLET ORAL
Qty: 180 | Refills: 3 | Status: ACTIVE | COMMUNITY
Start: 2024-10-18 | End: 1900-01-01

## 2024-10-18 RX ORDER — AMLODIPINE BESYLATE 5 MG/1
5 TABLET ORAL
Refills: 0 | Status: ACTIVE | COMMUNITY

## 2024-10-18 RX ORDER — ASPIRIN 81 MG
81 TABLET, DELAYED RELEASE (ENTERIC COATED) ORAL
Refills: 0 | Status: ACTIVE | COMMUNITY

## 2024-10-18 RX ORDER — ATORVASTATIN CALCIUM 40 MG/1
40 TABLET, FILM COATED ORAL
Refills: 0 | Status: ACTIVE | COMMUNITY

## 2024-11-05 ENCOUNTER — NON-APPOINTMENT (OUTPATIENT)
Age: 64
End: 2024-11-05

## 2024-11-05 ENCOUNTER — APPOINTMENT (OUTPATIENT)
Dept: CARDIOLOGY | Facility: CLINIC | Age: 64
End: 2024-11-05
Payer: MEDICARE

## 2024-11-05 VITALS
SYSTOLIC BLOOD PRESSURE: 138 MMHG | HEIGHT: 64 IN | BODY MASS INDEX: 21.51 KG/M2 | DIASTOLIC BLOOD PRESSURE: 84 MMHG | HEART RATE: 73 BPM | OXYGEN SATURATION: 96 % | WEIGHT: 126 LBS

## 2024-11-05 DIAGNOSIS — E78.5 HYPERLIPIDEMIA, UNSPECIFIED: ICD-10-CM

## 2024-11-05 DIAGNOSIS — Z95.5 PRESENCE OF CORONARY ANGIOPLASTY IMPLANT AND GRAFT: ICD-10-CM

## 2024-11-05 DIAGNOSIS — Z00.00 ENCOUNTER FOR GENERAL ADULT MEDICAL EXAMINATION W/OUT ABNORMAL FINDINGS: ICD-10-CM

## 2024-11-05 PROCEDURE — 93000 ELECTROCARDIOGRAM COMPLETE: CPT

## 2024-11-05 PROCEDURE — 99215 OFFICE O/P EST HI 40 MIN: CPT

## 2024-11-05 RX ORDER — PRASUGREL 5 MG/1
5 TABLET, FILM COATED ORAL
Qty: 93 | Refills: 2 | Status: ACTIVE | COMMUNITY
Start: 2024-11-05 | End: 1900-01-01

## 2024-12-20 ENCOUNTER — APPOINTMENT (OUTPATIENT)
Dept: CARDIOLOGY | Facility: CLINIC | Age: 64
End: 2024-12-20
Payer: MEDICARE

## 2024-12-20 DIAGNOSIS — I25.10 ATHEROSCLEROTIC HEART DISEASE OF NATIVE CORONARY ARTERY W/OUT ANGINA PECTORIS: ICD-10-CM

## 2024-12-20 DIAGNOSIS — E78.5 HYPERLIPIDEMIA, UNSPECIFIED: ICD-10-CM

## 2024-12-20 PROCEDURE — 93325 DOPPLER ECHO COLOR FLOW MAPG: CPT

## 2024-12-20 PROCEDURE — 93320 DOPPLER ECHO COMPLETE: CPT

## 2024-12-20 PROCEDURE — 93351 STRESS TTE COMPLETE: CPT

## 2025-01-31 ENCOUNTER — APPOINTMENT (OUTPATIENT)
Dept: CARDIOLOGY | Facility: CLINIC | Age: 65
End: 2025-01-31
Payer: MEDICARE

## 2025-01-31 ENCOUNTER — NON-APPOINTMENT (OUTPATIENT)
Age: 65
End: 2025-01-31

## 2025-01-31 VITALS
DIASTOLIC BLOOD PRESSURE: 70 MMHG | WEIGHT: 125 LBS | BODY MASS INDEX: 21.34 KG/M2 | HEART RATE: 57 BPM | SYSTOLIC BLOOD PRESSURE: 138 MMHG | HEIGHT: 64 IN

## 2025-01-31 DIAGNOSIS — I25.10 ATHEROSCLEROTIC HEART DISEASE OF NATIVE CORONARY ARTERY W/OUT ANGINA PECTORIS: ICD-10-CM

## 2025-01-31 DIAGNOSIS — E78.5 HYPERLIPIDEMIA, UNSPECIFIED: ICD-10-CM

## 2025-01-31 DIAGNOSIS — Z95.5 PRESENCE OF CORONARY ANGIOPLASTY IMPLANT AND GRAFT: ICD-10-CM

## 2025-01-31 DIAGNOSIS — R73.03 PREDIABETES.: ICD-10-CM

## 2025-01-31 PROCEDURE — 99214 OFFICE O/P EST MOD 30 MIN: CPT

## 2025-01-31 PROCEDURE — 93000 ELECTROCARDIOGRAM COMPLETE: CPT

## 2025-01-31 PROCEDURE — G2211 COMPLEX E/M VISIT ADD ON: CPT

## 2025-01-31 RX ORDER — METOPROLOL SUCCINATE 25 MG/1
25 TABLET, EXTENDED RELEASE ORAL DAILY
Refills: 0 | Status: ACTIVE | COMMUNITY

## 2025-01-31 RX ORDER — ROSUVASTATIN CALCIUM 40 MG/1
40 TABLET, FILM COATED ORAL
Qty: 90 | Refills: 3 | Status: ACTIVE | COMMUNITY
Start: 2025-01-31 | End: 1900-01-01

## 2025-03-12 NOTE — ASSESSMENT
[FreeTextEntry1] : Patient is a 62-year-old gentleman who suffered worsening left eye vision approximately 2 years ago and was diagnosed with a pituitary macroadenoma.  Due to problems with the pandemic, he was lost to follow-up initially but was able to see an endocrinologist.  Per his report, his labs are normal and his prolactin is normal.  He continues to have diminished vision in the left eye.  We discussed the natural history of benign macroadenomas.  I explained that I believe surgical intervention is important in this situation to decompress the optic nerves and mitigate the risk of future vision loss.  The patient has agreed to undergo surgical intervention on October 21 in the form of an endoscopic endonasal resection of his pituitary adenoma with Dr. Richardson Birmingham and myself.  We discussed the risks and benefits including, but not limited to infection, bleeding, visual loss, neuroendocrine dysfunction, stroke, recurrence of tumor, and even possibly death.  The patient demonstrates an excellent understanding.  We will work on medical clearance.\par \par PLAN: \par - Surgery 10/21, meet with Dr. Birmingham 10/20 as scheduled the day before\par \par Patient and his son verbalize understanding of today’s discussion and next steps in treatment plan. \par \par \par \par A total of 45 minutes was spent reviewing the labs, imaging and physical examination of the patient. We discussed the diagnosis, and the plan. The patient's questions were answered. The patient demonstrated an excellent understanding of the plan. \par \par  
room air

## 2025-06-27 ENCOUNTER — APPOINTMENT (OUTPATIENT)
Dept: CARDIOLOGY | Facility: CLINIC | Age: 65
End: 2025-06-27
Payer: MEDICARE

## 2025-06-27 VITALS
BODY MASS INDEX: 21.85 KG/M2 | HEART RATE: 52 BPM | HEIGHT: 64 IN | DIASTOLIC BLOOD PRESSURE: 80 MMHG | WEIGHT: 128 LBS | SYSTOLIC BLOOD PRESSURE: 142 MMHG

## 2025-06-27 PROCEDURE — 93000 ELECTROCARDIOGRAM COMPLETE: CPT

## 2025-06-27 PROCEDURE — G2211 COMPLEX E/M VISIT ADD ON: CPT

## 2025-06-27 PROCEDURE — 99214 OFFICE O/P EST MOD 30 MIN: CPT

## 2025-06-27 RX ORDER — EVOLOCUMAB 140 MG/ML
140 INJECTION, SOLUTION SUBCUTANEOUS
Qty: 3 | Refills: 3 | Status: ACTIVE | COMMUNITY
Start: 2025-06-27 | End: 1900-01-01

## 2025-08-20 ENCOUNTER — APPOINTMENT (OUTPATIENT)
Dept: CARDIOLOGY | Facility: CLINIC | Age: 65
End: 2025-08-20

## 2025-08-22 RX ORDER — ALIROCUMAB 150 MG/ML
150 INJECTION, SOLUTION SUBCUTANEOUS
Qty: 4 | Refills: 0 | Status: ACTIVE | COMMUNITY
Start: 2025-08-22 | End: 1900-01-01

## (undated) DEVICE — PREP BETADINE SPONGE STICKS

## (undated) DEVICE — PACKING NASAL STD 1.5X4.5X2CM

## (undated) DEVICE — DRAPE SURGICAL #1010

## (undated) DEVICE — CRANIAL MASK TRACKER

## (undated) DEVICE — VENODYNE/SCD SLEEVE CALF MEDIUM

## (undated) DEVICE — GLV 8.5 PROTEXIS (WHITE)

## (undated) DEVICE — ELCTR BOVIE SUCTION 8FR 6"

## (undated) DEVICE — GLV 8 PROTEXIS (WHITE)

## (undated) DEVICE — DRAPE TOWEL BLUE 17" X 24"

## (undated) DEVICE — Device

## (undated) DEVICE — MIDAS REX LEGEND TELESCOPING MATCH HEAD DIAMOND 4.5MM X 12CM

## (undated) DEVICE — GLV 7.5 PROTEXIS (WHITE)

## (undated) DEVICE — MIDAS REX LEGEND TELESCOPING MATCH HEAD DIAMOND 3.5MM X 12CM

## (undated) DEVICE — DRAPE LIGHT HANDLE COVER (GREEN)

## (undated) DEVICE — FOLEY TRAY 16FR 5CC LF UMETER CLOSED

## (undated) DEVICE — WARMING BLANKET LOWER ADULT

## (undated) DEVICE — PACK UPPER BODY

## (undated) DEVICE — DRAPE IRRIGATION POUCH 19X23"

## (undated) DEVICE — MARKING PEN W RULER

## (undated) DEVICE — DRAPE INSTRUMENT POUCH 6.75" X 11"

## (undated) DEVICE — STRYKER INSTRUMENT BATTERY

## (undated) DEVICE — TUBING SUCTION 20FT

## (undated) DEVICE — MIDAS REX LEGEND LUBRICANT DIFFUSER CARTRIDGE

## (undated) DEVICE — STAPLER SKIN PROXIMATE

## (undated) DEVICE — NDL ELECTRODE ULTRACLEAN 6

## (undated) DEVICE — STORZ DURA MICRO KNIFE INSERT SICKLE-SHAPED

## (undated) DEVICE — DRSG MEROCEL STANDARD W STRING 8CM

## (undated) DEVICE — SOL ANTI FOG